# Patient Record
Sex: FEMALE | Race: WHITE | NOT HISPANIC OR LATINO | Employment: FULL TIME | URBAN - METROPOLITAN AREA
[De-identification: names, ages, dates, MRNs, and addresses within clinical notes are randomized per-mention and may not be internally consistent; named-entity substitution may affect disease eponyms.]

---

## 2019-02-28 ENCOUNTER — HOSPITAL ENCOUNTER (OUTPATIENT)
Facility: MEDICAL CENTER | Age: 34
End: 2019-02-28
Attending: INTERNAL MEDICINE
Payer: COMMERCIAL

## 2019-02-28 ENCOUNTER — OFFICE VISIT (OUTPATIENT)
Dept: MEDICAL GROUP | Facility: MEDICAL CENTER | Age: 34
End: 2019-02-28
Payer: COMMERCIAL

## 2019-02-28 ENCOUNTER — HOSPITAL ENCOUNTER (OUTPATIENT)
Dept: LAB | Facility: MEDICAL CENTER | Age: 34
End: 2019-02-28
Attending: INTERNAL MEDICINE
Payer: COMMERCIAL

## 2019-02-28 VITALS
BODY MASS INDEX: 35.52 KG/M2 | HEIGHT: 71 IN | OXYGEN SATURATION: 96 % | SYSTOLIC BLOOD PRESSURE: 132 MMHG | TEMPERATURE: 98.3 F | WEIGHT: 253.75 LBS | HEART RATE: 92 BPM | DIASTOLIC BLOOD PRESSURE: 78 MMHG

## 2019-02-28 DIAGNOSIS — Z13.6 SCREENING FOR CARDIOVASCULAR CONDITION: ICD-10-CM

## 2019-02-28 DIAGNOSIS — N92.6 IRREGULAR PERIODS: ICD-10-CM

## 2019-02-28 DIAGNOSIS — R53.83 FATIGUE, UNSPECIFIED TYPE: ICD-10-CM

## 2019-02-28 DIAGNOSIS — Z12.4 SCREENING FOR MALIGNANT NEOPLASM OF CERVIX: ICD-10-CM

## 2019-02-28 DIAGNOSIS — L70.8 OTHER ACNE: ICD-10-CM

## 2019-02-28 DIAGNOSIS — L68.0 HIRSUTISM: ICD-10-CM

## 2019-02-28 DIAGNOSIS — E28.2 PCOS (POLYCYSTIC OVARIAN SYNDROME): ICD-10-CM

## 2019-02-28 DIAGNOSIS — Z00.00 HEALTH CARE MAINTENANCE: ICD-10-CM

## 2019-02-28 DIAGNOSIS — Z72.0 TOBACCO ABUSE: ICD-10-CM

## 2019-02-28 DIAGNOSIS — Z01.419 WELL FEMALE EXAM WITH ROUTINE GYNECOLOGICAL EXAM: ICD-10-CM

## 2019-02-28 DIAGNOSIS — Z76.89 ENCOUNTER TO ESTABLISH CARE: ICD-10-CM

## 2019-02-28 DIAGNOSIS — E66.9 OBESITY (BMI 30-39.9): ICD-10-CM

## 2019-02-28 LAB
25(OH)D3 SERPL-MCNC: 19 NG/ML (ref 30–100)
ALBUMIN SERPL BCP-MCNC: 4.4 G/DL (ref 3.2–4.9)
ALBUMIN/GLOB SERPL: 1.6 G/DL
ALP SERPL-CCNC: 52 U/L (ref 30–99)
ALT SERPL-CCNC: 17 U/L (ref 2–50)
ANION GAP SERPL CALC-SCNC: 5 MMOL/L (ref 0–11.9)
AST SERPL-CCNC: 16 U/L (ref 12–45)
BASOPHILS # BLD AUTO: 0.9 % (ref 0–1.8)
BASOPHILS # BLD: 0.07 K/UL (ref 0–0.12)
BILIRUB SERPL-MCNC: 0.7 MG/DL (ref 0.1–1.5)
BUN SERPL-MCNC: 11 MG/DL (ref 8–22)
CALCIUM SERPL-MCNC: 9.7 MG/DL (ref 8.5–10.5)
CHLORIDE SERPL-SCNC: 105 MMOL/L (ref 96–112)
CHOLEST SERPL-MCNC: 231 MG/DL (ref 100–199)
CO2 SERPL-SCNC: 24 MMOL/L (ref 20–33)
CREAT SERPL-MCNC: 0.74 MG/DL (ref 0.5–1.4)
EOSINOPHIL # BLD AUTO: 0.2 K/UL (ref 0–0.51)
EOSINOPHIL NFR BLD: 2.7 % (ref 0–6.9)
ERYTHROCYTE [DISTWIDTH] IN BLOOD BY AUTOMATED COUNT: 40.4 FL (ref 35.9–50)
FASTING STATUS PATIENT QL REPORTED: NORMAL
FSH SERPL-ACNC: 7.1 MIU/ML
GLOBULIN SER CALC-MCNC: 2.8 G/DL (ref 1.9–3.5)
GLUCOSE SERPL-MCNC: 90 MG/DL (ref 65–99)
HCT VFR BLD AUTO: 43.4 % (ref 37–47)
HDLC SERPL-MCNC: 47 MG/DL
HGB BLD-MCNC: 14.5 G/DL (ref 12–16)
IMM GRANULOCYTES # BLD AUTO: 0.01 K/UL (ref 0–0.11)
IMM GRANULOCYTES NFR BLD AUTO: 0.1 % (ref 0–0.9)
LDLC SERPL CALC-MCNC: 138 MG/DL
LH SERPL-ACNC: 9 IU/L
LYMPHOCYTES # BLD AUTO: 2.95 K/UL (ref 1–4.8)
LYMPHOCYTES NFR BLD: 39.6 % (ref 22–41)
MCH RBC QN AUTO: 30.7 PG (ref 27–33)
MCHC RBC AUTO-ENTMCNC: 33.4 G/DL (ref 33.6–35)
MCV RBC AUTO: 91.8 FL (ref 81.4–97.8)
MONOCYTES # BLD AUTO: 0.44 K/UL (ref 0–0.85)
MONOCYTES NFR BLD AUTO: 5.9 % (ref 0–13.4)
NEUTROPHILS # BLD AUTO: 3.78 K/UL (ref 2–7.15)
NEUTROPHILS NFR BLD: 50.8 % (ref 44–72)
NRBC # BLD AUTO: 0 K/UL
NRBC BLD-RTO: 0 /100 WBC
PLATELET # BLD AUTO: 265 K/UL (ref 164–446)
PMV BLD AUTO: 9.2 FL (ref 9–12.9)
POTASSIUM SERPL-SCNC: 3.9 MMOL/L (ref 3.6–5.5)
PROT SERPL-MCNC: 7.2 G/DL (ref 6–8.2)
RBC # BLD AUTO: 4.73 M/UL (ref 4.2–5.4)
SODIUM SERPL-SCNC: 134 MMOL/L (ref 135–145)
TRIGL SERPL-MCNC: 229 MG/DL (ref 0–149)
TSH SERPL DL<=0.005 MIU/L-ACNC: 3.13 UIU/ML (ref 0.38–5.33)
WBC # BLD AUTO: 7.5 K/UL (ref 4.8–10.8)

## 2019-02-28 PROCEDURE — 85025 COMPLETE CBC W/AUTO DIFF WBC: CPT

## 2019-02-28 PROCEDURE — 80061 LIPID PANEL: CPT

## 2019-02-28 PROCEDURE — 99000 SPECIMEN HANDLING OFFICE-LAB: CPT | Performed by: INTERNAL MEDICINE

## 2019-02-28 PROCEDURE — 36415 COLL VENOUS BLD VENIPUNCTURE: CPT

## 2019-02-28 PROCEDURE — 84403 ASSAY OF TOTAL TESTOSTERONE: CPT

## 2019-02-28 PROCEDURE — 84270 ASSAY OF SEX HORMONE GLOBUL: CPT

## 2019-02-28 PROCEDURE — 87624 HPV HI-RISK TYP POOLED RSLT: CPT

## 2019-02-28 PROCEDURE — 83001 ASSAY OF GONADOTROPIN (FSH): CPT

## 2019-02-28 PROCEDURE — 99214 OFFICE O/P EST MOD 30 MIN: CPT | Mod: 25 | Performed by: INTERNAL MEDICINE

## 2019-02-28 PROCEDURE — 82306 VITAMIN D 25 HYDROXY: CPT

## 2019-02-28 PROCEDURE — 84443 ASSAY THYROID STIM HORMONE: CPT

## 2019-02-28 PROCEDURE — 83002 ASSAY OF GONADOTROPIN (LH): CPT

## 2019-02-28 PROCEDURE — 80053 COMPREHEN METABOLIC PANEL: CPT

## 2019-02-28 PROCEDURE — 88175 CYTOPATH C/V AUTO FLUID REDO: CPT

## 2019-02-28 PROCEDURE — 99385 PREV VISIT NEW AGE 18-39: CPT | Performed by: INTERNAL MEDICINE

## 2019-02-28 ASSESSMENT — PATIENT HEALTH QUESTIONNAIRE - PHQ9: CLINICAL INTERPRETATION OF PHQ2 SCORE: 0

## 2019-02-28 NOTE — PROGRESS NOTES
CHIEF COMPLIANT:  Ling Angel is a 34 y.o. female who presents for annual exam    Pt has GYN provider: no    Recommendations:  Regular exercise at least 4 days a week  Diet: advised balanced diet  Dental exam at least 1-2 times per year  Sunscreen use: advised    Immunizations:  TdaP: Advised  Pneumonia: Advised  Influenza: Advised    GYN  Last PAP:   2011, normal   Abnormal PAP: no    Menarche:      Menses are irregular, with bleeding for ~ 6 days  No excess cramping or bleeding.   Takes OTC analgesics for cramping  Contraception: none    Irregular periods, acne, hirsutism, fatigue  PCOS  The patient has had the above signs and symptoms of PCOS.  She has never been tested.   Also complains of fatigue.   No temperature intolerance. No change in hair/skin quality, BMs.     OBESITY, Body mass index is 35.41 kg/m².  Onset: late 20'  Diet: Regular  Exercise: Insufficient  No temperature intolerance. No change in hair/skin quality, BMs.   No HTN, buffalo hump, purple striae, flushing.  FH of obesity: neg    CURRENT MEDICATIONS  No current outpatient prescriptions on file.     No current facility-administered medications for this visit.      ALLERGIES  Allergies: Amoxicillin  PAST MEDICAL HISTORY  She  has a past medical history of Allergy.  SURGICAL HISTORY  She  has no past surgical history on file.  SOCIAL HISTORY  Social History   Substance Use Topics   • Smoking status: Current Every Day Smoker     Packs/day: 0.25     Years: 3.00     Types: Cigarettes   • Smokeless tobacco: Never Used   • Alcohol use 2.4 oz/week     4 Glasses of wine per week     Social History     Social History Narrative   • No narrative on file     FAMILY HISTORY  Family History   Problem Relation Age of Onset   • Thyroid Mother         hypothyroid   • Hypertension Father    • Hyperlipidemia Father    • Other Sister         endometriosis   • Psychiatry Sister         depression   • Other Maternal Grandmother         alzheimers   •  "Genitourinary () Maternal Grandmother         PCOS   • Cancer Paternal Grandmother         lung   • Cancer Paternal Grandfather         lung   • Heart Attack Paternal Grandfather      Family Status   Relation Status   • Mo Alive   • Fa Alive   • Sis Alive   • MGMo    • PGMo    • PGFa    • MGFa Alive     REVIEW OF SYSTEMS  Constitutional: Denies fever, chills, sweats, fatigue.  Skin: negative for rash, scaling, itching, pigmentation, hair or nail changes.  Eyes: negative for blurred or double vision, eye pain, floaters and discharge from eyes.  ENT: negative for tinnitus, vertigo, dental problem and hoarseness, frequent URI's, sinus problems/pain.  Respiratory: negative for persistent cough, hemoptysis, dyspnea, wheezing, SOB.  Cardiovascular: negative for palpitations, tachycardia, irregular heart beats, chest pain, or peripheral edema.  Gastrointestinal: negative for anorexia, dysphagia, nausea, heartburn/reflux, abdominal pain, hemorrhoids, constipation or diarrhea.  Genitourinary: negative for dysuria, frequency, hesitancy, incontinence, abnormal vaginal discharge/bleeding.  Musculoskeletal: negative for back/joint pain, myalgia.   Neuro: negative for migraine headaches, involuntary movements or tremor  Psychiatric: negative for excessive alcohol use, illegal drug use, sleep problems, anxiety, depression.  Hematologic/Lymphatic/Immunologic: negative for anemia, unusual bruising, swollen glands.  Endocrine: negative for temperature intolerance, polydipsia, polyuria.     PHYSICAL EXAMINATION:  Blood pressure 132/78, pulse 92, temperature 36.8 °C (98.3 °F), temperature source Temporal, height 1.803 m (5' 10.98\"), weight 115.1 kg (253 lb 12 oz), last menstrual period 2019, SpO2 96 %, not currently breastfeeding.  Body mass index is 35.41 kg/m².  Wt Readings from Last 4 Encounters:   19 115.1 kg (253 lb 12 oz)   10/12/16 115.2 kg (254 lb)   10/21/11 87.1 kg (192 lb)   11 " 90.3 kg (199 lb)     General appearance:healthy, well developed, well nourished, well-groomed  Psych: alert, no distress, cooperative. Eye contact good, speech goal directed. Affect calm.   Eyes: conjunctivae and sclerae normal, lids and lashes normal, EOMI, PERRLA  ENT: Ears: external ears normal to inspection, canals clear. TMs pearly gray with normal light reflex and anatomic landmarks, Nose/Sinuses: nose shows no deformity, asymmetry, or inflammation, nasal mucosa normal, no sinus tenderness,   Oropharynx: lips normal without lesions, buccal mucosa normal, gums healthy, teeth intact, non-carious, palate normal, tongue midline and normal  Neck: no asymmetry, masses, adenopathy. Thyroid normal to palpation, carotids without bruits, no jugular venous distention  Lungs: clear to auscultation with good excursion, Normal respiratory rate. Chest symmetric no chest wall tenderness.  Cardiovascular: Regular rate and rhythm, no murmur.  No peripheral edema  Abdomen:  Soft, non-tender, no masses, HSM or palpable renal abnormalities. Bowel sounds normal, no bruits heard. No CVAT.  Musculoskeletal: no evidence of joint effusion, ROM of all joints is normal, no crepitation detected, strength grossly normal UE and LE, proximal and distal motor groups. No deformities present  Lymphatic: None significantly enlarged supraclavicular, axillary, or inguinal  Skin: color normal, vascularity normal, no rashes or suspicious lesions, no evidence of bleeding or bruising  Neuro: speech normal, mental status intact, gait grossly normal, muscle tone normal.  GYN: No suprapubic tenderness.  Perineum and external genitalia normal without rash.   Vagina with without discharge, normal mucosa.  Cervix w/o visible lesions or discharge. No CMT  Uterus normal size, non-tender, no masses,   Adnexa w/o mass or tenderness.   PAP smear was obtained.    LABS    Labs are reviewed and discussed with a patient  No results found for: CHOLSTRLTOT, LDL, HDL,  TRIGLYCERIDE    Lab Results   Component Value Date/Time    SODIUM 135 07/20/2011 07:12 AM    POTASSIUM 3.8 07/20/2011 07:12 AM    CHLORIDE 105 07/20/2011 07:12 AM    CO2 21 07/20/2011 07:12 AM    GLUCOSE 84 07/20/2011 07:12 AM    BUN 12 07/20/2011 07:12 AM    CREATININE 0.78 07/20/2011 07:12 AM     Lab Results   Component Value Date/Time    ALKPHOSPHAT 35 07/20/2011 07:12 AM    ASTSGOT 16 07/20/2011 07:12 AM    ALTSGPT 12 07/20/2011 07:12 AM    TBILIRUBIN 0.9 07/20/2011 07:12 AM      Lab Results   Component Value Date/Time    WBC 7.8 07/20/2011 07:12 AM    RBC 4.54 07/20/2011 07:12 AM    HEMOGLOBIN 14.5 07/20/2011 07:12 AM    HEMATOCRIT 41.5 07/20/2011 07:12 AM    MCV 91.5 07/20/2011 07:12 AM    MCH 31.9 07/20/2011 07:12 AM    MCHC 34.8 07/20/2011 07:12 AM    MPV 7.8 07/20/2011 07:12 AM    NEUTSPOLYS 68.0 07/20/2011 07:12 AM    LYMPHOCYTES 25.1 07/20/2011 07:12 AM    MONOCYTES 5.4 07/20/2011 07:12 AM    EOSINOPHILS 1.1 07/20/2011 07:12 AM    BASOPHILS 0.4 07/20/2011 07:12 AM      ASSESSMENT/PLAN    1. Well female exam with routine gynecological exam  - THINPREP PAP WITH HPV; Future  2. Screening for malignant neoplasm of cervix  - THINPREP PAP WITH HPV; Future    3. Health care maintenance  Per HPI  - Lipid Profile; Future    4. Screening for cardiovascular condition  - Lipid Profile; Future    5. Encounter to establish care  Reviewed PMH, PSH, FH, SH, ALL, MEDS, HCM/IMM.     6. Irregular periods  May be multifactorial, pending labs and imaging  Possible PCOS per sx  - TESTOSTERONE F&T FEMALES/CHILD; Future  - US-PELVIC TRANSVAGINAL ONLY; Future  - FSH/LH; Future  7. Other acne  - TESTOSTERONE F&T FEMALES/CHILD; Future  - US-PELVIC TRANSVAGINAL ONLY; Future  - FSH/LH; Future  8. Hirsutism  - TESTOSTERONE F&T FEMALES/CHILD; Future  - US-PELVIC TRANSVAGINAL ONLY; Future  - FSH/LH; Future    9. Fatigue, unspecified type  May be multifactorial  - CBC WITH DIFFERENTIAL; Future  - Comp Metabolic Panel; Future  - TSH;  Future  - VITAMIN D,25 HYDROXY; Future  - TESTOSTERONE F&T FEMALES/CHILD; Future    10. PCOS (polycystic ovarian syndrome)  Possible diagnosis  - US-PELVIC TRANSVAGINAL ONLY; Future  - FSH/LH; Future    11. Obesity (BMI 30-39.9)  - OBESITY COUNSELING (No Charge): Patient identified as having weight management issue.  Appropriate orders and counseling given.    12. Tobacco abuse  Advised to quit smoking    Smoking Counseling: Nonsmoker    Next office visit is due in 2 months    All questions are answered.     Please note that this dictation was created using voice recognition software. Despite all efforts, some minor grammar mistakes are possible.

## 2019-03-01 DIAGNOSIS — Z01.419 WELL FEMALE EXAM WITH ROUTINE GYNECOLOGICAL EXAM: ICD-10-CM

## 2019-03-01 DIAGNOSIS — Z12.4 SCREENING FOR MALIGNANT NEOPLASM OF CERVIX: ICD-10-CM

## 2019-03-01 LAB
CYTOLOGY REG CYTOL: ABNORMAL
HPV HR 12 DNA CVX QL NAA+PROBE: POSITIVE
HPV16 DNA SPEC QL NAA+PROBE: POSITIVE
HPV18 DNA SPEC QL NAA+PROBE: NEGATIVE
SPECIMEN SOURCE: ABNORMAL

## 2019-03-03 LAB
SHBG SERPL-SCNC: 30 NMOL/L (ref 30–135)
TESTOST FREE SERPL-MCNC: 9.3 PG/ML (ref 1.3–9.2)
TESTOST SERPL-MCNC: 53 NG/DL (ref 9–55)

## 2019-03-05 ENCOUNTER — TELEPHONE (OUTPATIENT)
Dept: MEDICAL GROUP | Facility: MEDICAL CENTER | Age: 34
End: 2019-03-05

## 2019-03-05 NOTE — TELEPHONE ENCOUNTER
----- Message from Cookie White M.D. sent at 3/4/2019  9:36 AM PST -----  Please, notify the patient that labs are reviewed, to schedule OV to discuss, thanks, Dr Bhatti

## 2019-03-05 NOTE — TELEPHONE ENCOUNTER
Phone Number Called: [phone] (home)     Message: Lm for patient to call back to schedule FV    Left Message for patient to call back: yes

## 2019-03-06 NOTE — TELEPHONE ENCOUNTER
Phone Number Called: [phone] (home)      Message: Left message for patient to schedule a follow up.     Left Message for patient to call back: yes

## 2019-03-08 ENCOUNTER — OFFICE VISIT (OUTPATIENT)
Dept: MEDICAL GROUP | Facility: MEDICAL CENTER | Age: 34
End: 2019-03-08
Payer: COMMERCIAL

## 2019-03-08 VITALS
OXYGEN SATURATION: 98 % | HEIGHT: 71 IN | HEART RATE: 89 BPM | DIASTOLIC BLOOD PRESSURE: 76 MMHG | WEIGHT: 254.85 LBS | SYSTOLIC BLOOD PRESSURE: 138 MMHG | TEMPERATURE: 98.8 F | BODY MASS INDEX: 35.68 KG/M2

## 2019-03-08 DIAGNOSIS — R87.613 HIGH GRADE SQUAMOUS INTRAEPITHELIAL LESION OF CERVIX: ICD-10-CM

## 2019-03-08 DIAGNOSIS — E55.9 HYPOVITAMINOSIS D: ICD-10-CM

## 2019-03-08 DIAGNOSIS — E87.1 HYPONATREMIA: ICD-10-CM

## 2019-03-08 DIAGNOSIS — R87.618 PAP SMEAR ABNORMALITY OF CERVIX/HUMAN PAPILLOMAVIRUS (HPV) POSITIVE: ICD-10-CM

## 2019-03-08 DIAGNOSIS — E66.9 OBESITY (BMI 30-39.9): ICD-10-CM

## 2019-03-08 DIAGNOSIS — E78.5 DYSLIPIDEMIA: ICD-10-CM

## 2019-03-08 PROCEDURE — 99214 OFFICE O/P EST MOD 30 MIN: CPT | Performed by: INTERNAL MEDICINE

## 2019-03-08 RX ORDER — ERGOCALCIFEROL 1.25 MG/1
50000 CAPSULE ORAL
Qty: 12 CAP | Refills: 1 | Status: SHIPPED | OUTPATIENT
Start: 2019-03-08 | End: 2019-09-25 | Stop reason: SDUPTHER

## 2019-03-08 RX ORDER — PHENTERMINE HYDROCHLORIDE 37.5 MG/1
37.5 TABLET ORAL
Qty: 30 TAB | Refills: 2 | Status: SHIPPED
Start: 2019-03-08 | End: 2019-06-06

## 2019-03-08 NOTE — PROGRESS NOTES
CHIEF COMPLAINT  Chief Complaint   Patient presents with   • Results     Lab Results   PAP    HPI  Patient is a 34 y.o. female patient who presents today for the following     Hyponatremia  The patient had borderline low sodium at 134  -She has craving for salt.  No diarrhea or increased sweating.    Dyslipidemia  The patient had abnormal lipid panel, no medications.  Diet: Regular  Exercise: Limited  BMI: 35  FH: Parents    Hypovitaminosis D  The patient had low vitamin D level at 19.   No vitamin D supplement.    HGSIL, pod HPV  The patient had positive D above GYN abnormalities on well female exam done on 2/28/19.  Denies abnormal vaginal discharge or bleeding.  She has not been sexually active for 2 years.  No family history of GYN cancer.    Reviewed PMH, PSH, FH, SH, ALL, HCM/IMM, no changes  Reviewed MEDS, no changes    Patient Active Problem List    Diagnosis Date Noted   • Health care maintenance 02/28/2019   • Obesity (BMI 30-39.9) 02/28/2019   • Irregular periods 02/28/2019   • PCOS (polycystic ovarian syndrome) 02/28/2019   • Tobacco abuse 10/12/2016     CURRENT MEDICATIONS  Current Outpatient Prescriptions   Medication Sig Dispense Refill   • vitamin D, Ergocalciferol, (DRISDOL) 30088 units Cap capsule Take 1 Cap by mouth every 7 days. 12 Cap 1   • phentermine (ADIPEX-P) 37.5 MG tablet Take 1 Tab by mouth every morning before breakfast for 90 days. 30 Tab 2     No current facility-administered medications for this visit.      ALLERGIES  Allergies: Amoxicillin  PAST MEDICAL HISTORY  Past Medical History:   Diagnosis Date   • Allergy     seasonal allergies   • Irregular periods    • Overweight      SURGICAL HISTORY  She  has no past surgical history on file.  SOCIAL HISTORY  Social History   Substance Use Topics   • Smoking status: Current Every Day Smoker     Packs/day: 0.25     Years: 3.00     Types: Cigarettes   • Smokeless tobacco: Never Used   • Alcohol use 2.4 oz/week     4 Glasses of wine per week  "    Social History     Social History Narrative   • No narrative on file     FAMILY HISTORY  Family History   Problem Relation Age of Onset   • Thyroid Mother         hypothyroid   • Hyperlipidemia Mother    • Hypertension Father    • Hyperlipidemia Father    • Other Sister         endometriosis   • Psychiatry Sister         depression   • Other Maternal Grandmother         alzheimers   • Genitourinary () Maternal Grandmother         PCOS   • Cancer Paternal Grandmother         lung   • Cancer Paternal Grandfather         lung   • Heart Attack Paternal Grandfather      Family Status   Relation Status   • Mo Alive   • Fa Alive   • Sis Alive   • MGMo    • PGMo    • PGFa    • MGFa Alive     ROS   Constitutional: Negative for fatigue.  HENT: Negative for congestion  Eyes: Negative for blurred vision.   Respiratory: Negative for shortness of breath.  Cardiovascular: Negative for chest pain, palpitations.   Gastrointestinal: Negative for heartburn, abdominal pain.   Genitourinary: As above  Musculoskeletal: Negative for significant myalgias, back pain and joint pain.   Skin: Negative for rash and itching.   Neuro: Negative for dizziness,  headaches.   Endo/Heme/Allergies: Does not bruise/bleed easily.   Psychiatric/Behavioral: Negative for depression.    PHYSICAL EXAM   Blood pressure 138/76, pulse 89, temperature 37.1 °C (98.8 °F), temperature source Temporal, height 1.803 m (5' 10.98\"), weight 115.6 kg (254 lb 13.6 oz), SpO2 98 %, not currently breastfeeding. Body mass index is 35.56 kg/m².  General:  NAD, well appearing  HEENT:   NC/AT, PERRLA, EOMI, TMs are clear. Oropharyngeal mucosa is pink,  without lesions;  no cervical / supraclavicular  lymphadenopathy, no thyromegaly.    Cardiovascular: RRR.   No m/r/g. No carotid bruits .      Lungs:   CTAB, no w/r/r, no respiratory distress.  Abdomen: Soft, NT/ND.  Extremities:  2+ DP and radial pulses bilaterally.  No c/c/e.   Skin:  Warm, dry.  No " erythema. No rash.   Neurologic: Alert & oriented x 3. CN II-XII grossly intact.No focal deficits.  Psychiatric:  Affect normal, mood normal, judgment normal.    LABS     Labs are reviewed and discussed with a patient  Lab Results   Component Value Date/Time    CHOLSTRLTOT 231 (H) 02/28/2019 08:29 AM     (H) 02/28/2019 08:29 AM    HDL 47 02/28/2019 08:29 AM    TRIGLYCERIDE 229 (H) 02/28/2019 08:29 AM       Lab Results   Component Value Date/Time    SODIUM 134 (L) 02/28/2019 08:29 AM    POTASSIUM 3.9 02/28/2019 08:29 AM    CHLORIDE 105 02/28/2019 08:29 AM    CO2 24 02/28/2019 08:29 AM    GLUCOSE 90 02/28/2019 08:29 AM    BUN 11 02/28/2019 08:29 AM    CREATININE 0.74 02/28/2019 08:29 AM     Lab Results   Component Value Date/Time    ALKPHOSPHAT 52 02/28/2019 08:29 AM    ASTSGOT 16 02/28/2019 08:29 AM    ALTSGPT 17 02/28/2019 08:29 AM    TBILIRUBIN 0.7 02/28/2019 08:29 AM       Ref. Range 2/28/2019 2/28/2019    25-Hydroxy   Vitamin D 25 Latest Ref Range: 30 - 100 ng/mL  19 (L)   TSH Latest Ref Range: 0.380 - 5.330 uIU/mL  3.130   HPV Genotype 16 Latest Ref Range: Negative  POSITIVE (A)    HPV Genotype 18 Latest Ref Range: Negative  Negative    HPV Other High Risk  Latest Ref Range: Negative  POSITIVE (A)    Source Unknown Cervical    FSH Latest Units: mIU/mL  7.1   Luteinizing Hormone Latest Units: IU/L  9.0   Sex  Bind Globulin Latest Ref Range: 30 - 135 nmol/L  30   Testosterone Fr LCMS Latest Ref Range: 1.3 - 9.2 pg/mL  9.3 (H)   Testosterone,Total Latest Ref Range: 9 - 55 ng/dL  53     Lab Results   Component Value Date/Time    WBC 7.5 02/28/2019 08:29 AM    RBC 4.73 02/28/2019 08:29 AM    HEMOGLOBIN 14.5 02/28/2019 08:29 AM    HEMATOCRIT 43.4 02/28/2019 08:29 AM    MCV 91.8 02/28/2019 08:29 AM    MCH 30.7 02/28/2019 08:29 AM    MCHC 33.4 (L) 02/28/2019 08:29 AM    MPV 9.2 02/28/2019 08:29 AM    NEUTSPOLYS 50.80 02/28/2019 08:29 AM    LYMPHOCYTES 39.60 02/28/2019 08:29 AM    MONOCYTES 5.90 02/28/2019 08:29  AM    EOSINOPHILS 2.70 02/28/2019 08:29 AM    BASOPHILS 0.90 02/28/2019 08:29 AM        IMAGING     None    ASSESMENT AND PLAN        1. Hyponatremia  Advised to continue good salt intake    2. Dyslipidemia  Treatment options discussed.  Advised low carb diet, exercise, watch for WT.     3. Hypovitaminosis D  Start high-dose vitamin D  - VITAMIN D,25 HYDROXY; Future  - vitamin D, Ergocalciferol, (DRISDOL) 88699 units Cap capsule; Take 1 Cap by mouth every 7 days.  Dispense: 12 Cap; Refill: 1    4. Pap smear abnormality of cervix/human papillomavirus (HPV) positive  - REFERRAL TO GYNECOLOGY  5. High grade squamous intraepithelial lesion of cervix  - REFERRAL TO GYNECOLOGY    6. Obesity (BMI 30-39.9)  Advised  -1500-calorie diet  -Daily exercise with heart rate 120-130 for at least 30 minutes    - phentermine (ADIPEX-P) 37.5 MG tablet; Take 1 Tab by mouth every morning before breakfast for 90 days.  Dispense: 30 Tab; Refill: 2  - OBESITY COUNSELING (No Charge): Patient identified as having weight management issue.  Appropriate orders and counseling given.    Counseling:   - Smoking: Advised to quit smoking    Followup: Return in about 3 months (around 6/8/2019), or if symptoms worsen or fail to improve.    All questions are answered.    Please note that this dictation was created using voice recognition software, and that there might be errors of franco and possibly content.

## 2019-03-14 ENCOUNTER — HOSPITAL ENCOUNTER (OUTPATIENT)
Dept: RADIOLOGY | Facility: MEDICAL CENTER | Age: 34
End: 2019-03-14
Attending: INTERNAL MEDICINE
Payer: COMMERCIAL

## 2019-03-14 DIAGNOSIS — L70.8 OTHER ACNE: ICD-10-CM

## 2019-03-14 DIAGNOSIS — L68.0 HIRSUTISM: ICD-10-CM

## 2019-03-14 DIAGNOSIS — N92.6 IRREGULAR PERIODS: ICD-10-CM

## 2019-03-14 DIAGNOSIS — E28.2 PCOS (POLYCYSTIC OVARIAN SYNDROME): ICD-10-CM

## 2019-03-14 PROCEDURE — 76830 TRANSVAGINAL US NON-OB: CPT

## 2019-03-25 ENCOUNTER — GYNECOLOGY VISIT (OUTPATIENT)
Dept: OBGYN | Facility: CLINIC | Age: 34
End: 2019-03-25
Payer: COMMERCIAL

## 2019-03-25 VITALS — DIASTOLIC BLOOD PRESSURE: 70 MMHG | WEIGHT: 248 LBS | SYSTOLIC BLOOD PRESSURE: 118 MMHG | BODY MASS INDEX: 34.61 KG/M2

## 2019-03-25 DIAGNOSIS — N92.6 IRREGULAR MENSTRUAL BLEEDING: ICD-10-CM

## 2019-03-25 DIAGNOSIS — R87.613 HGSIL (HIGH GRADE SQUAMOUS INTRAEPITHELIAL LESION) ON PAP SMEAR OF CERVIX: ICD-10-CM

## 2019-03-25 DIAGNOSIS — E28.2 PCOS (POLYCYSTIC OVARIAN SYNDROME): ICD-10-CM

## 2019-03-25 PROCEDURE — 99204 OFFICE O/P NEW MOD 45 MIN: CPT | Performed by: OBSTETRICS & GYNECOLOGY

## 2019-05-02 ENCOUNTER — OFFICE VISIT (OUTPATIENT)
Dept: MEDICAL GROUP | Facility: MEDICAL CENTER | Age: 34
End: 2019-05-02
Payer: COMMERCIAL

## 2019-05-02 VITALS
TEMPERATURE: 97.2 F | HEART RATE: 96 BPM | OXYGEN SATURATION: 97 % | DIASTOLIC BLOOD PRESSURE: 82 MMHG | WEIGHT: 245.15 LBS | HEIGHT: 70 IN | BODY MASS INDEX: 35.1 KG/M2 | SYSTOLIC BLOOD PRESSURE: 132 MMHG

## 2019-05-02 DIAGNOSIS — R05.9 COUGH: ICD-10-CM

## 2019-05-02 DIAGNOSIS — Z20.828 EXPOSURE TO THE FLU: ICD-10-CM

## 2019-05-02 DIAGNOSIS — J40 BRONCHITIS: ICD-10-CM

## 2019-05-02 DIAGNOSIS — J06.9 UPPER RESPIRATORY INFECTION, ACUTE: ICD-10-CM

## 2019-05-02 LAB
FLUAV+FLUBV AG SPEC QL IA: NEGATIVE
INT CON NEG: NEGATIVE
INT CON POS: POSITIVE

## 2019-05-02 PROCEDURE — 87804 INFLUENZA ASSAY W/OPTIC: CPT | Performed by: INTERNAL MEDICINE

## 2019-05-02 PROCEDURE — 99214 OFFICE O/P EST MOD 30 MIN: CPT | Performed by: INTERNAL MEDICINE

## 2019-05-02 RX ORDER — CODEINE PHOSPHATE/GUAIFENESIN 10-100MG/5
5 LIQUID (ML) ORAL 3 TIMES DAILY PRN
Qty: 120 ML | Refills: 0 | Status: SHIPPED | OUTPATIENT
Start: 2019-05-02 | End: 2019-05-02 | Stop reason: SDUPTHER

## 2019-05-02 RX ORDER — AZITHROMYCIN 250 MG/1
TABLET, FILM COATED ORAL
Qty: 6 TAB | Refills: 0 | Status: SHIPPED | OUTPATIENT
Start: 2019-05-02 | End: 2020-05-29

## 2019-05-02 RX ORDER — AZITHROMYCIN 250 MG/1
TABLET, FILM COATED ORAL
Qty: 6 TAB | Refills: 0 | Status: SHIPPED | OUTPATIENT
Start: 2019-05-02 | End: 2019-05-02 | Stop reason: SDUPTHER

## 2019-05-02 RX ORDER — CODEINE PHOSPHATE/GUAIFENESIN 10-100MG/5
5 LIQUID (ML) ORAL 3 TIMES DAILY PRN
Qty: 120 ML | Refills: 0 | Status: SHIPPED | OUTPATIENT
Start: 2019-05-02 | End: 2019-05-12

## 2019-05-02 NOTE — PROGRESS NOTES
CHIEF COMPLAINT  Chief Complaint   Patient presents with   • Cough   • Fever   • Chills     HPI  Patient is a 34 y.o. female patient who presents today for the following     URI, cough  The patient got sick > 1 week ago with:  · Nasal congestion w yellow d/c now  · Fever, chills,  body aches, HA, body aches  · Sore throat, slight  · Cough with yello sputum  · N    Denies:   · Postnasal drip, pain in sinus areas  · Swollen glands  · Earache  · Difficulty swallowing  · Wheezing, chest pain  · Decreased appetite, vomiting, diarrhea, abdominal pain  · Skin rash.    · Meds used:   Cough syrup  · Sick contact:  The whole family  · Flu vaccine:    no    Reviewed PMH, PSH, FH, SH, ALL, HCM/IMM, no changes  Reviewed MEDS, no changes    Patient Active Problem List    Diagnosis Date Noted   • Health care maintenance 02/28/2019   • Obesity (BMI 30-39.9) 02/28/2019   • Irregular periods 02/28/2019   • PCOS (polycystic ovarian syndrome) 02/28/2019   • Tobacco abuse 10/12/2016     CURRENT MEDICATIONS  Current Outpatient Prescriptions   Medication Sig Dispense Refill   • metFORMIN (GLUCOPHAGE) 500 MG Tab Take 1 Tab by mouth 2 times a day, with meals. 60 Tab 11   • norethindrone-ethinyl estradiol (OVCON) 0.4-35 MG-MCG per tablet Take 1 Tab by mouth every day. 28 Tab 11   • vitamin D, Ergocalciferol, (DRISDOL) 45444 units Cap capsule Take 1 Cap by mouth every 7 days. 12 Cap 1   • phentermine (ADIPEX-P) 37.5 MG tablet Take 1 Tab by mouth every morning before breakfast for 90 days. 30 Tab 2     No current facility-administered medications for this visit.      ALLERGIES  Allergies: Amoxicillin  PAST MEDICAL HISTORY  Past Medical History:   Diagnosis Date   • Allergy     seasonal allergies   • Irregular periods    • Overweight      SURGICAL HISTORY  She  has no past surgical history on file.  SOCIAL HISTORY  Social History   Substance Use Topics   • Smoking status: Current Every Day Smoker     Packs/day: 0.25     Years: 3.00     Types:  "Cigarettes   • Smokeless tobacco: Never Used   • Alcohol use 2.4 oz/week     4 Glasses of wine per week     Social History     Social History Narrative   • No narrative on file     FAMILY HISTORY  Family History   Problem Relation Age of Onset   • Thyroid Mother         hypothyroid   • Hyperlipidemia Mother    • Hypertension Father    • Hyperlipidemia Father    • Other Sister         endometriosis   • Psychiatry Sister         depression   • Other Maternal Grandmother         alzheimers   • Genitourinary () Maternal Grandmother         PCOS   • Cancer Paternal Grandmother         lung   • Cancer Paternal Grandfather         lung   • Heart Attack Paternal Grandfather      Family Status   Relation Status   • Mo Alive   • Fa Alive   • Sis Alive   • MGMo    • PGMo    • PGFa    • MGFa Alive     ROS   Constitutional: as above.  HENT: as above.  Eyes: Negative for eye tearing.   Respiratory: as above.  Cardiovascular: Negative for chest pain, palpitations.   Gastrointestinal: As above.  Musculoskeletal: Complains of body aches  Skin: Negative for rash.   Neuro: Negative for dizziness; c/o headaches.   Endo/Heme/Allergies: Does not bruise/bleed easily.   Psychiatric/Behavioral: Negative for depression.    PHYSICAL EXAM   /82 (BP Location: Left arm, Patient Position: Sitting, BP Cuff Size: Adult)   Pulse 96   Temp 36.2 °C (97.2 °F) (Temporal)   Ht 1.778 m (5' 10\")   Wt 111.2 kg (245 lb 2.4 oz)   SpO2 97%  Body mass index is 35.18 kg/m².  General:  NAD, appears acutely sick  HEENT:   NC/AT, PERRLA, EOMI, TMs are clear. Pharyngeal mucosa is erythematous,  without lesions;  no cervical / supraclavicular  lymphadenopathy, no thyromegaly.    Cardiovascular: RRR.   No m/r/g. No carotid bruits .      Lungs:   Rhonchi bilaterally without wheezing or rails; no respiratory distress.  Abdomen: Soft, NT/ND + BS; no suprapubic tenderness; no masses or hepatosplenomegaly.  Extremities:  2+ DP and " radial pulses bilaterally.  No c/c/e.   Skin:  Warm, dry.  No erythema. No rash.   Neurologic: Alert & oriented x 3. No focal deficits.  Psychiatric:  Affect normal, mood normal, judgment normal.    LABS     Labs are reviewed and discussed with a patient    POCT flu: neg      IMAGING     None    ASSESMENT AND PLAN        1. Upper respiratory infection, acute  - POCT Influenza A/B  2. Exposure to the flu  - POCT Influenza A/B  3. Cough  - guaifenesin-codeine (TUSSI-ORGANIDIN NR) 100-10 MG/5ML syrup; Take 5 mL by mo  uth 3 times a day as needed for Cough for up to 10 days.  Dispense: 120 mL; Refill: 0  4. Bronchitis  - azithromycin (ZITHROMAX) 250 MG Tab; Take 1 tabl twice daily the first day, then 1 tabl daily, PO.  Dispense: 6 Tab; Refill: 0    Advised to take abx after a meal.   Side effects of abx use discussed with a patient. Advised to stop abx and call if develop any side effect, including diarrhea.     Recommendations:   - increase fluid intake;   - may take Tylenol/ibuprofen for fever, pain; nasal decongestant   - may apply warm packs over sinus areas     Counseling:   - Smoking:  Nonsmoker    Followup: as needed    All questions are answered.    Please note that this dictation was created using voice recognition software, and that there might be errors of franco and possibly content.

## 2019-05-07 ENCOUNTER — HOSPITAL ENCOUNTER (OUTPATIENT)
Dept: LAB | Facility: MEDICAL CENTER | Age: 34
End: 2019-05-07
Attending: OBSTETRICS & GYNECOLOGY
Payer: COMMERCIAL

## 2019-05-07 ENCOUNTER — GYNECOLOGY VISIT (OUTPATIENT)
Dept: OBGYN | Facility: CLINIC | Age: 34
End: 2019-05-07
Payer: COMMERCIAL

## 2019-05-07 VITALS
BODY MASS INDEX: 34.93 KG/M2 | DIASTOLIC BLOOD PRESSURE: 84 MMHG | SYSTOLIC BLOOD PRESSURE: 126 MMHG | HEIGHT: 70 IN | WEIGHT: 244 LBS

## 2019-05-07 DIAGNOSIS — Z32.00 ENCOUNTER FOR PREGNANCY TEST, RESULT UNKNOWN: ICD-10-CM

## 2019-05-07 DIAGNOSIS — R87.618 PAP SMEAR ABNORMALITY OF CERVIX/HUMAN PAPILLOMAVIRUS (HPV) POSITIVE: ICD-10-CM

## 2019-05-07 LAB
INT CON NEG: NEGATIVE
INT CON POS: POSITIVE
PATHOLOGY CONSULT NOTE: NORMAL
POC URINE PREGNANCY TEST: NEGATIVE

## 2019-05-07 PROCEDURE — 88305 TISSUE EXAM BY PATHOLOGIST: CPT | Mod: 59

## 2019-05-07 PROCEDURE — 81025 URINE PREGNANCY TEST: CPT | Performed by: OBSTETRICS & GYNECOLOGY

## 2019-05-07 PROCEDURE — 57454 BX/CURETT OF CERVIX W/SCOPE: CPT | Performed by: OBSTETRICS & GYNECOLOGY

## 2019-05-07 NOTE — LETTER
COLPOSCOPY / LEEP DATA SHEET    Ling Angel     1985      34 y.o.      No LMP recorded. Patient is not currently having periods (Reason: Irregular Menses).     @EDC@       Medical history:   o MVP    o Blood dyscrasia    o Rh     o Other: .    STD history:    o HPV     o HSV     o HIV     o GC     o Chlamydia     o None     o Other: .    HIV:   o Positive     o Negative                            IV Drug User:   o  Yes    o  No .    Age of first coitus:                                                Number of sex partners in lifetime:  .    Reason for exam:.    Prior abnormal PAPS?    o  Yes  o  No        Treatment: .    Prior history of condyloma?    o  Yes  o  No        Treatment:.     Colposcopic view - description:                                 Satisfactory?    o  Yes  o  No                    Squamo-columnar junction seen?  o  Yes  o  No.    Entire lesion seen?     o  Yes  o  No          PAP done?  o  Yes  o  No           Biopsies done?  o  Yes  o  No.    Biopsy sites:.    ECC done?    o  Yes  o  No      If no, reason:.    Impression:.    Recommendations:.             Colposcopist: ______________________________________________ Date: ___________________

## 2019-05-07 NOTE — PROGRESS NOTES
Ling Angel  34 y.o.  Female  here today for colposcopy to evaluate her for abnormal pap:     HSIL.    HPV +.      external genitalia normal, normal Bartholin's glands, urethra, Marley's glands, no vulvar lesions, normal discharge, well estrogenized, good vaginal support, vault clean with normal discharge, normal appearing perineal body and perianal region        External genitalia,urethral meatus, urethra, bladder and vagina normal. Cervix, uterus and adnexa intact and normal.  Anus and perineum normal. Bimanual and rectovaginal without masses or tenderness.}        SCJ visualized - lesion at 7 o'clock  : visible lesion(s) at 7 o'clock- acetowhite changes, no mosaicism or punctation        Ass:   HSIL  Colposcopy assessment: : Adequate: positive    MELISSA I : yes    CINII: no    CINIIIyes and no  doing well without problems  P.   Cervical Bx  Performed: 0700  ECC done  Pelvic Rest  Anticipate Vaginal discharge and spotting  RTC: as needed, see colpo sheet for drawing of cervix.

## 2019-05-09 DIAGNOSIS — D06.9 CIN III (CERVICAL INTRAEPITHELIAL NEOPLASIA GRADE III) WITH SEVERE DYSPLASIA: ICD-10-CM

## 2019-05-09 NOTE — PROGRESS NOTES
Sent in mychart:    Lennox Dubon,   Your pathology is unfortunately demonstrating MELISSA 3. We need to proceed with a LEEP procedure. This is an excisional procedure and can be done in the office. We numb the cervix and remove the tip of the cervix where the cellular changes are. I would recommend taking the rest of the day off of work and planning to take it easy for the whole week following this procedure. We do have the option of doing the LEEP in the operating room under anesthesia if the idea of doing it in the office worries you. If you have questions and want to discuss this before we do it, I would be happy to meet with you for an appointment to answer any questions you may have. Otherwise, I will submit a prior authorization request from your insurance so that we can proceed with the LEEP in the office. Once we have prior authorization, the office will call you to make the appointment.     Thank you,   Miranda Rich

## 2019-05-13 ENCOUNTER — TELEPHONE (OUTPATIENT)
Dept: OBGYN | Facility: CLINIC | Age: 34
End: 2019-05-13

## 2019-05-13 NOTE — TELEPHONE ENCOUNTER
05/13/19 2:28 PM     LM for pt to call back in regards to results of MELISSA 3 and need to do LEEP.

## 2019-05-13 NOTE — TELEPHONE ENCOUNTER
Pt returned our call. Pt has been notified by Dr. Rich through [employer] message. Pt had not other questions

## 2019-07-05 ENCOUNTER — GYNECOLOGY VISIT (OUTPATIENT)
Dept: OBGYN | Facility: CLINIC | Age: 34
End: 2019-07-05
Payer: COMMERCIAL

## 2019-07-05 ENCOUNTER — HOSPITAL ENCOUNTER (OUTPATIENT)
Dept: LAB | Facility: MEDICAL CENTER | Age: 34
End: 2019-07-05
Attending: OBSTETRICS & GYNECOLOGY
Payer: COMMERCIAL

## 2019-07-05 VITALS — DIASTOLIC BLOOD PRESSURE: 82 MMHG | SYSTOLIC BLOOD PRESSURE: 138 MMHG | BODY MASS INDEX: 35.15 KG/M2 | WEIGHT: 245 LBS

## 2019-07-05 DIAGNOSIS — R87.613 HIGH GRADE SQUAMOUS INTRAEPITHELIAL LESION OF CERVIX: ICD-10-CM

## 2019-07-05 DIAGNOSIS — Z98.890 S/P LEEP (STATUS POST LOOP ELECTROSURGICAL EXCISION PROCEDURE): ICD-10-CM

## 2019-07-05 DIAGNOSIS — D06.9 CIN III (CERVICAL INTRAEPITHELIAL NEOPLASIA GRADE III) WITH SEVERE DYSPLASIA: ICD-10-CM

## 2019-07-05 PROCEDURE — 88305 TISSUE EXAM BY PATHOLOGIST: CPT

## 2019-07-05 PROCEDURE — 88307 TISSUE EXAM BY PATHOLOGIST: CPT

## 2019-07-05 PROCEDURE — 57522 CONIZATION OF CERVIX: CPT | Performed by: OBSTETRICS & GYNECOLOGY

## 2019-07-05 PROCEDURE — 81025 URINE PREGNANCY TEST: CPT | Performed by: OBSTETRICS & GYNECOLOGY

## 2019-07-05 RX ORDER — IBUPROFEN 600 MG/1
600 TABLET ORAL EVERY 6 HOURS PRN
Qty: 30 TAB | Refills: 0 | Status: SHIPPED | OUTPATIENT
Start: 2019-07-05

## 2019-07-05 RX ORDER — HYDROCODONE BITARTRATE AND ACETAMINOPHEN 5; 325 MG/1; MG/1
1 TABLET ORAL EVERY 4 HOURS PRN
Qty: 10 TAB | Refills: 0 | Status: SHIPPED | OUTPATIENT
Start: 2019-07-05 | End: 2019-07-12

## 2019-07-05 NOTE — NON-PROVIDER
Pt here for a [facility]   Pharmacy verified  Good# [phone]  Pregnancy test: Negative

## 2019-07-05 NOTE — PROGRESS NOTES
Date of Procedure: 19    Patient presents to office for LEEP procedure. She is doing well today and has no complaints.  I explained procedure to patient and I reviewed risks, benefits and possible complications including bleeding complications, infection, injury to surrounding structures including bladder and ureters, possible association with  labor and cervical scar formation reviewed.  All questions and concerns were addressed.  Patient agrees for procedure and informed consents were obtained.  Urine pregnancy test is negative.    Vital signs stable, afebrile    PreOp Diagnosis:   MELISSA III    PostOp Diagnosis:   Same    Procedure(s):  Loop electrosurgical excision procedure of the cervix (LEEP)  ECC    Surgeon(s):  Marck Yuen MD    Anesthesiologist/Type of Anesthesia:  Local 1% lidocaine with epinephrine 12 mL    Specimen: Cervical ecto/endocervix biopsy    Estimated Blood Loss: 5 cc    Complications: none      DESCRIPTION OF PROCEDURE: Pathology from colposcopy reviewed in detail with the patient. Consent forms and procedure reviewed in detail with the patient.  The patient was placed in the dorsal supine lithotomy position.      A coated metal bivalve speculum was placed in the vagina.  Anterior lip of the cervix was grasped with a single-tooth tenaculum.  The vagina was prepped with Betadine.  Cervix was prepped with Lugol's solution to demarcate cervical lesion. 1% lidocaine with epinephrine 12 mL was injected into the cervix circumferentially. A loop wire  (Gynex Radius Loop Electrode 15 mm x8mm) was placed on the bovie and used to take a biopsy of the endocervix.  Hemostasis was obtained at the LEEP bed with the ball tipped Bovie. Monsel solution was applied against the LEEP bed. Good hemostasis was noted.    All instruments were removed from the vagina. All needle and instrument counts were correct. The patient tolerated the procedure very well. She was discharged home from the office in  stable condition.    PLAN: Biopsy to be sent to pathology. Further plan of care pending results.  Strict pelvic rest for 2 weeks. Pads only, no intercourse.  Will call patient next week with results. Bleeding precautions educated.  Postop pain management discussed and ibuprofen was prescribed along with a few tablets of Norco.patient was counseled on these medications.  Call MD with questions/concerns.

## 2019-07-23 ENCOUNTER — GYNECOLOGY VISIT (OUTPATIENT)
Dept: OBGYN | Facility: CLINIC | Age: 34
End: 2019-07-23
Payer: COMMERCIAL

## 2019-07-23 VITALS — SYSTOLIC BLOOD PRESSURE: 126 MMHG | WEIGHT: 247 LBS | BODY MASS INDEX: 35.44 KG/M2 | DIASTOLIC BLOOD PRESSURE: 80 MMHG

## 2019-07-23 DIAGNOSIS — R87.613 HIGH GRADE SQUAMOUS INTRAEPITHELIAL LESION OF CERVIX: ICD-10-CM

## 2019-07-23 PROCEDURE — 99024 POSTOP FOLLOW-UP VISIT: CPT | Performed by: OBSTETRICS & GYNECOLOGY

## 2019-07-23 NOTE — NON-PROVIDER
Pt here to F/u on test results  LEEP on 07/05/19   Pt denies vaginal bleeding or pelvic pain  Pharmacy verified  Good# [phone]

## 2019-07-23 NOTE — PROGRESS NOTES
SUBJECTIVE:  Ling Angel presents to the clinic 2 weeks following Leep Cone     Eating a regular diet without difficulty. Bowel movement are Normal.  The patient is not having any pain. Spotting. Patient Denies Incisional pain, drainage or redness    OBJECTIVE:  /80   Wt 112 kg (247 lb)   BMI 35.44 kg/m²   Current Outpatient Prescriptions on File Prior to Visit   Medication Sig Dispense Refill   • ibuprofen (MOTRIN) 600 MG Tab Take 1 Tab by mouth every 6 hours as needed for Mild Pain or Moderate Pain. 30 Tab 0   • azithromycin (ZITHROMAX) 250 MG Tab Take 1 tabl twice daily the first day, then 1 tabl daily, PO. 6 Tab 0   • metFORMIN (GLUCOPHAGE) 500 MG Tab Take 1 Tab by mouth 2 times a day, with meals. 60 Tab 11   • norethindrone-ethinyl estradiol (OVCON) 0.4-35 MG-MCG per tablet Take 1 Tab by mouth every day. 28 Tab 11   • vitamin D, Ergocalciferol, (DRISDOL) 76172 units Cap capsule Take 1 Cap by mouth every 7 days. 12 Cap 1     No current facility-administered medications on file prior to visit.        Constitutional:  alert, no distress.  Abdomen:  soft, bowel sounds active, non-tender.  Incision:  healing well, no drainage, no erythema, no hernia, no seroma, no swelling, no dehiscence, incision well approximated.    IMPRESSION: Doing well postoperatively.  Pt is to increase activities as tolerated.    Lab:   Recent Results (from the past 1008 hour(s))   Histology Request    Collection Time: 07/05/19  3:04 PM   Result Value Ref Range    Pathology Request Sent to Histo    POCT Pregnancy    Collection Time: 07/05/19  3:32 PM   Result Value Ref Range    POC Urine Pregnancy Test Negative Negative    Internal Control Positive Positive     Internal Control Negative Negative      Lab:   [facility]              [address] [place] [address], [address] [facility], [place] [address]              Phone [phone]          Fax [phone]  Kirk Allen M.D.     Sulma Hicks M.D.     Luis Antonio  "ALISSON Quiroga M.D.    JONAH Howard D.O. David E. Palosaari, M.D.    Patient:    FLORINDA LANZA       Specimen    [id number]                                           #:      Copies to:            MATTEO VALLADARES MD                      SURGICAL PATHOLOGY CONSULTATION    FINAL DIAGNOSIS:    A. Cervical cone biopsy:         Squamoglandular mucosa demonstrating area of biopsy site changes          and HPV changes but no dysplasia or invasive carcinoma          identified.  B. Endocervical curettings:         Fragments of benign endocervical tissue in a background of          mucin. No dysplasia or malignancy identified.    Comment: This case has been reviewed by a second pathologist, Dr. Rainey, who concurs with the diagnosis. Previous biopsy showed high  grade squamous dysplasia (CIN3) which correlated with recent PAP smear  results.  Findings are most consistent with the area of high grade  dysplasia being biopsied out.                                        Diagnosis performed by:                                      BASHIR SAL MD  ------------------------------------------------------------------------  --  CODES: 2003x1  2005x1    PREOPERATIVE DIAGNOSIS:  High grade squamous intraepithelial lesion of cervix (R87.613); MELISSA III  (cervical intraepithelial neoplasia grade III) with severe dysplasia  (D06.9).    SPECIMEN(S)  A. Cervical cone biopsy:  B. Endocervical curettings:    GROSS DESCRIPTION:  A.  Received in formalin labeled with the two patient identifiers and  designated \"Florinda Lanza, cervical cone biopsy\" are two disrupted  fragments of pink-gray mucosa that are unoriented. They measure 1.5 x 1  x 0.6; the other measures 1.7 x 0.7 x 0..4. The first fragment is  inked, sectioned and submitted in the first two cassettes. The second  fragment is so irregular it falls apart upon sectioning.  It is  submitted in cassettes " "3 and 4. TE 4/[id number]    B.  Received in formalin labeled with the two patient identifiers and  designated \"Ling Jeanne, ECC\" is an endocervical brush with  adherent tan tinted mucoidal material. This is scraped off the brush.  The formalin is filtered resulting in a 0.5 cm aggregate of mucoidal  material. TE 1/[id number] NGUYEN/km    MICROSCOPIC DESCRIPTION:  Microscopic examination was performed.  Please see diagnosis.      Report electronically signed by:  BASHIR SAL MD  Diagnosis performed at: [facility]  Pathology  Department, [address] [place] [address]  [id number]  PLAN:  Continue any current medications.  (See Med List for details.)  Return to clinic  : in 6 month(s).    "

## 2019-09-25 DIAGNOSIS — E55.9 HYPOVITAMINOSIS D: ICD-10-CM

## 2019-09-25 RX ORDER — ERGOCALCIFEROL 1.25 MG/1
CAPSULE ORAL
Qty: 12 CAP | Refills: 1 | Status: SHIPPED | OUTPATIENT
Start: 2019-09-25 | End: 2020-05-29

## 2019-10-04 ENCOUNTER — GYNECOLOGY VISIT (OUTPATIENT)
Dept: OBGYN | Facility: CLINIC | Age: 34
End: 2019-10-04
Payer: COMMERCIAL

## 2019-10-04 VITALS — SYSTOLIC BLOOD PRESSURE: 138 MMHG | DIASTOLIC BLOOD PRESSURE: 64 MMHG | WEIGHT: 254 LBS | BODY MASS INDEX: 36.45 KG/M2

## 2019-10-04 DIAGNOSIS — N92.1 MENOMETRORRHAGIA: ICD-10-CM

## 2019-10-04 DIAGNOSIS — E28.2 PCOS (POLYCYSTIC OVARIAN SYNDROME): ICD-10-CM

## 2019-10-04 LAB
INT CON NEG: NEGATIVE
INT CON POS: POSITIVE
POC URINE PREGNANCY TEST: NEGATIVE

## 2019-10-04 PROCEDURE — 81025 URINE PREGNANCY TEST: CPT | Performed by: OBSTETRICS & GYNECOLOGY

## 2019-10-04 PROCEDURE — 99213 OFFICE O/P EST LOW 20 MIN: CPT | Performed by: OBSTETRICS & GYNECOLOGY

## 2019-10-04 NOTE — PROGRESS NOTES
Chief complaint;    Ling Angel is a 34 y.o.  who presents complaining of irregular vaginal bleeding..  Over the last 2 months the patient has had menstrual bleeding 2 to 3 weeks out of the month.  Patient has a history of PCOS per her other GYN provider she is never been pregnant she is not sexually active currently.  She is taking metformin.    Review of systems; denies fever chills abdominal pain, denies chest pain shortness of breath or urinary symptoms  Past medical history-  Past Medical History:   Diagnosis Date   • Allergy     seasonal allergies   • Irregular periods    • Overweight      Past surgical history-History reviewed. No pertinent surgical history.  Allergies-Amoxicillin  Medications-  Current Outpatient Medications on File Prior to Visit   Medication Sig Dispense Refill   • ergocalciferol (DRISDOL) 42536 UNIT capsule TAKE ONE CAPSULE BY MOUTH EVERY 7 DAYS 12 Cap 1   • ibuprofen (MOTRIN) 600 MG Tab Take 1 Tab by mouth every 6 hours as needed for Mild Pain or Moderate Pain. 30 Tab 0   • azithromycin (ZITHROMAX) 250 MG Tab Take 1 tabl twice daily the first day, then 1 tabl daily, PO. 6 Tab 0   • metFORMIN (GLUCOPHAGE) 500 MG Tab Take 1 Tab by mouth 2 times a day, with meals. 60 Tab 11   • norethindrone-ethinyl estradiol (OVCON) 0.4-35 MG-MCG per tablet Take 1 Tab by mouth every day. 28 Tab 11     No current facility-administered medications on file prior to visit.      Social history-  Social History     Socioeconomic History   • Marital status: Single     Spouse name: Not on file   • Number of children: Not on file   • Years of education: Not on file   • Highest education level: Not on file   Occupational History   • Not on file   Social Needs   • Financial resource strain: Not on file   • Food insecurity:     Worry: Not on file     Inability: Not on file   • Transportation needs:     Medical: Not on file     Non-medical: Not on file   Tobacco Use   • Smoking status: Current Every  Day Smoker     Packs/day: 0.25     Years: 3.00     Pack years: 0.75     Types: Cigarettes   • Smokeless tobacco: Never Used   Substance and Sexual Activity   • Alcohol use: Yes     Alcohol/week: 2.4 oz     Types: 4 Glasses of wine per week   • Drug use: No   • Sexual activity: Yes     Partners: Male     Birth control/protection: Condom     Comment: single; no kids; wk:    Lifestyle   • Physical activity:     Days per week: Not on file     Minutes per session: Not on file   • Stress: Not on file   Relationships   • Social connections:     Talks on phone: Not on file     Gets together: Not on file     Attends [employer] service: Not on file     Active member of club or organization: Not on file     Attends meetings of clubs or organizations: Not on file     Relationship status: Not on file   • Intimate partner violence:     Fear of current or ex partner: Not on file     Emotionally abused: Not on file     Physically abused: Not on file     Forced sexual activity: Not on file   Other Topics Concern   • Not on file   Social History Narrative   • Not on file     Past Family History-no history of breast or ovarian cancer    Physical examination;  Alert and oriented x3  General a thin well-developed well-nourished female in no apparent distress  Vitals:    10/04/19 1406   BP: 138/64   Weight: 115.2 kg (254 lb)     Skin is warm and dry  Neck-supple  HEENT-head-atraumatic, normocephalic, EOMI, PERRLA  Cardiovascular-regular rate and rhythm, normal S1-S2, no murmurs or gallops  Lungs-clear to auscultation bilaterally  Back-negative CVA tenderness  Abdomen-nondistended positive bowel sounds soft nontender no masses or hepatosplenomegaly  Pelvic examination;  External genitalia-no visible lesions   Vagina-minimal blood or discharge  Cervix-no gross lesions  Uterus-normal size and shape, nontender  Adnexa without mass or tenderness  Extremities without cyanosis clubbing or edema  Neurologic exam grossly  intact    Urine hCG-negative    Impression;  Menometrorrhagia  PCOS      Plan;  Change OCP to  [place]      25  Minutes spent with the patient in face-to-face contact, greater than 50% of the time spent on counseling and coordination of care. All questions answered in detail.

## 2020-02-08 ENCOUNTER — OFFICE VISIT (OUTPATIENT)
Dept: URGENT CARE | Facility: CLINIC | Age: 35
End: 2020-02-08
Payer: COMMERCIAL

## 2020-02-08 VITALS
SYSTOLIC BLOOD PRESSURE: 104 MMHG | DIASTOLIC BLOOD PRESSURE: 70 MMHG | WEIGHT: 240 LBS | RESPIRATION RATE: 20 BRPM | HEIGHT: 71 IN | BODY MASS INDEX: 33.6 KG/M2 | HEART RATE: 112 BPM | OXYGEN SATURATION: 99 % | TEMPERATURE: 97.8 F

## 2020-02-08 DIAGNOSIS — R68.89 FLU-LIKE SYMPTOMS: ICD-10-CM

## 2020-02-08 DIAGNOSIS — J03.00 STREP TONSILLITIS: ICD-10-CM

## 2020-02-08 LAB
FLUAV+FLUBV AG SPEC QL IA: NEGATIVE
INT CON NEG: NEGATIVE
INT CON NEG: NORMAL
INT CON POS: NORMAL
INT CON POS: POSITIVE
S PYO AG THROAT QL: POSITIVE

## 2020-02-08 PROCEDURE — 87880 STREP A ASSAY W/OPTIC: CPT | Performed by: EMERGENCY MEDICINE

## 2020-02-08 PROCEDURE — 87804 INFLUENZA ASSAY W/OPTIC: CPT | Performed by: EMERGENCY MEDICINE

## 2020-02-08 PROCEDURE — 99203 OFFICE O/P NEW LOW 30 MIN: CPT | Performed by: EMERGENCY MEDICINE

## 2020-02-08 RX ORDER — DEXAMETHASONE 4 MG/1
10 TABLET ORAL ONCE
Qty: 3 TAB | Refills: 0 | Status: SHIPPED | OUTPATIENT
Start: 2020-02-08 | End: 2020-02-08

## 2020-02-08 RX ORDER — AZITHROMYCIN 250 MG/1
TABLET, FILM COATED ORAL
Qty: 6 TAB | Refills: 0 | Status: SHIPPED | OUTPATIENT
Start: 2020-02-08 | End: 2020-05-29

## 2020-02-08 ASSESSMENT — ENCOUNTER SYMPTOMS
DIARRHEA: 0
NAUSEA: 1
MYALGIAS: 1
HOARSE VOICE: 0
COUGH: 1
SWOLLEN GLANDS: 1
ABDOMINAL PAIN: 0
VOMITING: 0
HEADACHES: 1

## 2020-02-08 ASSESSMENT — PAIN SCALES - GENERAL: PAINLEVEL: 8=MODERATE-SEVERE PAIN

## 2020-02-08 NOTE — PROGRESS NOTES
Subjective:      Ling Angel is a 35 y.o. female who presents with Pharyngitis (sore throat, fever, swollen glands, no appitite, for 3 days)            Pharyngitis    This is a new problem. Episode onset: 3 days. The problem has been unchanged. Neither side of throat is experiencing more pain than the other. Maximum temperature: recent. The pain is moderate. Associated symptoms include congestion, coughing, headaches, a plugged ear sensation and swollen glands. Pertinent negatives include no abdominal pain, diarrhea, hoarse voice or vomiting. She has had no exposure to strep. She has tried NSAIDs for the symptoms. The treatment provided mild relief.       Review of Systems   Constitutional: Positive for malaise/fatigue.   HENT: Positive for congestion. Negative for hoarse voice.    Respiratory: Positive for cough.    Gastrointestinal: Positive for nausea. Negative for abdominal pain, diarrhea and vomiting.   Musculoskeletal: Positive for myalgias.   Skin: Negative for rash.   Neurological: Positive for headaches.   Endo/Heme/Allergies: Positive for environmental allergies.     Past Medical History:   Diagnosis Date   • Allergy     seasonal allergies   • Irregular periods    • Overweight       Amoxicillin     Current Outpatient Medications:   •  Norethin-Eth Estrad Triphasic, 1 Tab, Oral, DAILY, Taking  •  ibuprofen, 600 mg, Oral, Q6HRS PRN, Taking  •  metFORMIN, 500 mg, Oral, BID WITH MEALS, Taking  •  norethindrone-ethinyl estradiol, 1 Tab, Oral, DAILY, Taking  •  ergocalciferol, TAKE ONE CAPSULE BY MOUTH EVERY 7 DAYS, Not Taking  •  azithromycin, Take 1 tabl twice daily the first day, then 1 tabl daily, PO., Not Taking   family history includes Cancer in her paternal grandfather and paternal grandmother; Genitourinary () Problems in her maternal grandmother; Heart Attack in her paternal grandfather; Hyperlipidemia in her father and mother; Hypertension in her father; Other in her maternal grandmother  "and sister; Psychiatric Illness in her sister; Thyroid in her mother.   Social History     Tobacco Use   • Smoking status: Current Every Day Smoker     Packs/day: 0.25     Years: 3.00     Pack years: 0.75     Types: Cigarettes   • Smokeless tobacco: Never Used   Substance Use Topics   • Alcohol use: Yes     Alcohol/week: 2.4 oz     Types: 4 Glasses of wine per week   • Drug use: No         Objective:     /70   Pulse (!) 112   Temp 36.6 °C (97.8 °F) (Temporal)   Resp 20   Ht 1.803 m (5' 11\")   Wt 108.9 kg (240 lb)   SpO2 99%   BMI 33.47 kg/m²      Physical Exam  Constitutional:       General: She is not in acute distress.     Appearance: She is well-developed. She is not toxic-appearing.   HENT:      Head:      Jaw: No trismus.      Right Ear: Tympanic membrane and ear canal normal.      Left Ear: Tympanic membrane and ear canal normal.      Nose: Mucosal edema and rhinorrhea present.      Mouth/Throat:      Pharynx: Uvula midline. Posterior oropharyngeal erythema present. No oropharyngeal exudate or uvula swelling.      Tonsils: Tonsillar exudate present. No tonsillar abscesses. Swelling: 3+ on the right. 3+ on the left.   Eyes:      Conjunctiva/sclera: Conjunctivae normal.   Neck:      Musculoskeletal: Neck supple.      Trachea: Trachea normal.   Cardiovascular:      Rate and Rhythm: Normal rate and regular rhythm.      Heart sounds: Normal heart sounds.   Pulmonary:      Effort: Pulmonary effort is normal.      Breath sounds: Normal breath sounds.   Lymphadenopathy:      Cervical: Cervical adenopathy present.      Right cervical: Superficial cervical adenopathy present.      Left cervical: Superficial cervical adenopathy present.   Skin:     General: Skin is warm and dry.   Neurological:      Mental Status: She is alert and oriented to person, place, and time.   Psychiatric:         Behavior: Behavior is cooperative.                 Assessment/Plan:       1. StrepTonsillitis  Positive- POCT Rapid " Strep A  Rx Zpak, Decadron  Recommended supportive care measures, including rest, increasing oral fluid intake and use of over-the-counter medications for relief of symptoms.  2. Flu-like symptoms  negative- POCT Influenza A/B

## 2020-02-08 NOTE — LETTER
February 8, 2020       Patient: Ling Angel   YOB: 1985   Date of Visit: 2/8/2020         To Whom It May Concern:    It is my medical opinion that Ling Angel should not attend work February 8-9, 2020.      Sincerely,          Oswaldo Gibson M.D.  Electronically Signed

## 2020-04-20 DIAGNOSIS — E28.2 PCOS (POLYCYSTIC OVARIAN SYNDROME): ICD-10-CM

## 2020-04-20 DIAGNOSIS — N92.6 IRREGULAR MENSTRUAL BLEEDING: ICD-10-CM

## 2020-04-20 RX ORDER — KETOCONAZOLE 20 MG/ML
5 SHAMPOO TOPICAL
Qty: 250 ML | Refills: 5 | Status: SHIPPED | OUTPATIENT
Start: 2020-04-20 | End: 2020-05-29

## 2020-04-20 RX ORDER — KETOCONAZOLE 20 MG/ML
SHAMPOO TOPICAL
COMMUNITY
End: 2020-04-20 | Stop reason: SDUPTHER

## 2020-05-14 DIAGNOSIS — N92.6 IRREGULAR MENSTRUAL BLEEDING: ICD-10-CM

## 2020-05-14 DIAGNOSIS — E28.2 PCOS (POLYCYSTIC OVARIAN SYNDROME): ICD-10-CM

## 2020-05-26 ENCOUNTER — TELEPHONE (OUTPATIENT)
Dept: HEALTH INFORMATION MANAGEMENT | Facility: OTHER | Age: 35
End: 2020-05-26

## 2020-05-26 NOTE — TELEPHONE ENCOUNTER
1. Caller Name: Jose Armando                 Call Back Number: [phone]  [facility] PCP or Specialty Provider: Yes Dr. Bhatti        2.  Does patient have any new or worsening symptoms of respiratory illness? No.    3.  Does patient have any comoribidities? None     4.  Has the patient traveled in the last 14 days OR had any known contact with someone who is suspected or confirmed to have COVID-19?  No.    5. Disposition: Cleared by RN Triage as potential is low for COVID-19; OK to keep/schedule appointment    Note routed to [facility] Provider: LORETO only.      Patient was exposed to Covid 19 in January and became ill with respiratory symptoms and Strep in early February.  She would like to have the antibody test for Covid 19.

## 2020-05-28 PROBLEM — E66.9 OBESITY (BMI 30-39.9): Status: RESOLVED | Noted: 2019-02-28 | Resolved: 2020-05-28

## 2020-05-28 PROBLEM — N92.6 IRREGULAR MENSTRUAL CYCLE: Status: RESOLVED | Noted: 2019-02-28 | Resolved: 2020-05-28

## 2020-05-28 NOTE — PROGRESS NOTES
CHIEF COMPLAINT   Annual    HPI  Ling Angel is a 35 y.o. female who presents today for the following     Recommendations:  Regular exercise at least 4 days a week  Diet: advised balanced diet  Dental exam at least 1-2 times per year  Sunscreen use: advised     Immunizations:  TdaP: Advised  Pneumonia: Advised  Influenza: Advised     GYN  Last PAP:   2019, normal   Abnormal PAP: no     Menarche:      Menses are irregular, with bleeding for ~ 6 days  No excess cramping or bleeding.   Takes OTC analgesics for cramping  Contraception: OCP     PCOS, MELISSA, St post LEEP  The patient has had the above signs and symptoms of PCOS.  She has never been tested.              Also complains of fatigue.              No temperature intolerance. No change in hair/skin quality, BMs.    St post LEEP, 7/2029    BMI 36  Onset: late 20'  Diet: Regular  Exercise: Insufficient  No temperature intolerance. No change in hair/skin quality, BMs.   No HTN, buffalo hump, purple striae, flushing.  FH of obesity: neg     Dyslipidemia  The patient had abnormal lipid panel, no meds  Diet: Regular  Exercise: Insufficient  BMI: 36  Family history: Parents.     Hypovitaminosis D  The patient had low vitamin D level.  Vitamin D supplement: OTC.    Reviewed PMH, PSH, FH, SH, ALL, HCM/IMM, no changes  Reviewed MEDS, no changes    Patient Active Problem List    Diagnosis Date Noted   • MELISSA III (cervical intraepithelial neoplasia grade III) with severe dysplasia 07/05/2019   • S/P LEEP (status post loop electrosurgical excision procedure) 07/05/2019   • Health care maintenance 02/28/2019   • Obesity (BMI 30-39.9) 02/28/2019   • Irregular periods 02/28/2019   • PCOS (polycystic ovarian syndrome) 02/28/2019   • Tobacco abuse 10/12/2016     CURRENT MEDICATIONS  Current Outpatient Medications   Medication Sig Dispense Refill   • metFORMIN (GLUCOPHAGE) 500 MG Tab TAKE 1 TABLET BY MOUTH TWICE A DAY WITH MEALS 60 Tab 0   • norethindrone-ethinyl  estradiol (OVCON) 0.4-35 MG-MCG per tablet Take 1 Tab by mouth every day. 28 Tab 11   • ketoconazole (NIZORAL) 2 % shampoo Apply 5 mL to affected area(s) 1 time daily as needed. 250 mL 5   • azithromycin (ZITHROMAX) 250 MG Tab Take 2 pills on day 1, then 1 pill daily on day 2-5 6 Tab 0   • Norethin-Eth Estrad Triphasic 0.5/1/0.5-35 MG-MCG Tab Take 1 Tab by mouth every day. 28 Tab 11   • ergocalciferol (DRISDOL) 00786 UNIT capsule TAKE ONE CAPSULE BY MOUTH EVERY 7 DAYS (Patient not taking: Reported on 2/8/2020) 12 Cap 1   • ibuprofen (MOTRIN) 600 MG Tab Take 1 Tab by mouth every 6 hours as needed for Mild Pain or Moderate Pain. 30 Tab 0   • azithromycin (ZITHROMAX) 250 MG Tab Take 1 tabl twice daily the first day, then 1 tabl daily, PO. (Patient not taking: Reported on 2/8/2020) 6 Tab 0     No current facility-administered medications for this visit.      ALLERGIES  Allergies: Amoxicillin  PAST MEDICAL HISTORY  Past Medical History:   Diagnosis Date   • Allergy     seasonal allergies   • Irregular periods    • Overweight      SURGICAL HISTORY  She  has no past surgical history on file.  SOCIAL HISTORY  Social History     Tobacco Use   • Smoking status: Current Every Day Smoker     Packs/day: 0.25     Years: 3.00     Pack years: 0.75     Types: Cigarettes   • Smokeless tobacco: Never Used   Substance Use Topics   • Alcohol use: Yes     Alcohol/week: 2.4 oz     Types: 4 Glasses of wine per week   • Drug use: No     Social History     Social History Narrative   • Not on file     FAMILY HISTORY  Family History   Problem Relation Age of Onset   • Thyroid Mother         hypothyroid   • Hyperlipidemia Mother    • Hypertension Father    • Hyperlipidemia Father    • Other Sister         endometriosis   • Psychiatric Illness Sister         depression   • Other Maternal Grandmother         alzheimers   • Genitourinary () Problems Maternal Grandmother         PCOS   • Cancer Paternal Grandmother         lung   • Cancer  "Paternal Grandfather         lung   • Heart Attack Paternal Grandfather      Family Status   Relation Name Status   • Mo  Alive   • Fa  Alive   • Sis  Alive   • MGMo     • PGMo     • PGFa     • MGFa  Alive     ROS   Constitutional: Negative for fever, chills, fatigue.  HENT: Negative for congestion, sore throat.  Eyes: Negative for vision problems.   Respiratory: Negative for cough, shortness of breath.  Cardiovascular: Negative for chest pain, palpitations.   Gastrointestinal: Negative for heartburn, nausea, abdominal pain.   Genitourinary: Negative for dysuria.  Musculoskeletal: Negative for significant myalgia, back and joint pain.   Skin: Negative for rash.   Neuro: Negative for dizziness, weakness and headaches.   Endo/Heme/Allergies: Does not bruise/bleed easily.   Psychiatric/Behavioral: Negative for depression.    Objective   Blood Pressure 104/68 (BP Location: Right arm, Patient Position: Sitting, BP Cuff Size: Adult long)   Pulse 74   Temperature 36.5 °C (97.7 °F) (Temporal)   Height 1.803 m (5' 11\")   Weight 119.6 kg (263 lb 9.6 oz)   Oxygen Saturation 98%   Body Mass Index 36.76 kg/m²   Physical Exam:  Constitutional: Alert, no distress, well-groomed.  Skin: No rash in visible areas.  Eye: Round. Conjunctiva clear, lids normal.  ENMT: Lips pink without lesions, good dentition. Phonation normal.  Neck: No visible masses or thyromegaly. Moves freely without pain.  CV: no peripheral cyanosis, tachycardia.  Respiratory: Unlabored respiratory effort, no cough or audible wheezing.  Psych: Alert and oriented x3, normal affect and mood.     Labs     Labs are reviewed and discussed with a patient  Lab Results   Component Value Date/Time    CHOLSTRLTOT 231 (H) 2019 08:29 AM     (H) 2019 08:29 AM    HDL 47 2019 08:29 AM    TRIGLYCERIDE 229 (H) 2019 08:29 AM       Lab Results   Component Value Date/Time    SODIUM 134 (L) 2019 08:29 AM    POTASSIUM " 3.9 02/28/2019 08:29 AM    CHLORIDE 105 02/28/2019 08:29 AM    CO2 24 02/28/2019 08:29 AM    GLUCOSE 90 02/28/2019 08:29 AM    BUN 11 02/28/2019 08:29 AM    CREATININE 0.74 02/28/2019 08:29 AM     Lab Results   Component Value Date/Time    ALKPHOSPHAT 52 02/28/2019 08:29 AM    ASTSGOT 16 02/28/2019 08:29 AM    ALTSGPT 17 02/28/2019 08:29 AM    TBILIRUBIN 0.7 02/28/2019 08:29 AM      No results found for: HBA1C  No results found for: TSH  No results found for: FREET4    Lab Results   Component Value Date/Time    WBC 7.5 02/28/2019 08:29 AM    RBC 4.73 02/28/2019 08:29 AM    HEMOGLOBIN 14.5 02/28/2019 08:29 AM    HEMATOCRIT 43.4 02/28/2019 08:29 AM    MCV 91.8 02/28/2019 08:29 AM    MCH 30.7 02/28/2019 08:29 AM    MCHC 33.4 (L) 02/28/2019 08:29 AM    MPV 9.2 02/28/2019 08:29 AM    NEUTSPOLYS 50.80 02/28/2019 08:29 AM    LYMPHOCYTES 39.60 02/28/2019 08:29 AM    MONOCYTES 5.90 02/28/2019 08:29 AM    EOSINOPHILS 2.70 02/28/2019 08:29 AM    BASOPHILS 0.90 02/28/2019 08:29 AM      Imaging      None    Assessment and Plan     Ling Angel is a 35 y.o. female    1. Annual physical exam  Reviewed PMH, PSH, FH, SH, ALL, MEDS, HCM/IMM.   Advised healthy habits, diet, exercise.    2. Health care maintenance  Per HPI    3. PCOS (polycystic ovarian syndrome)  Continue metformin  - REFERRAL TO GYNECOLOGY  4. MELISSA III (cervical intraepithelial neoplasia grade III) with severe dysplasia  - REFERRAL TO GYNECOLOGY  5. S/P LEEP (status post loop electrosurgical excision procedure)  - REFERRAL TO GYNECOLOGY    6. Obesity (BMI 30-39.9)  Advised low-calorie diet, daily exercise  - phentermine (ADIPEX-P) 37.5 MG tablet; Take 1 Tab by mouth every morning before breakfast for 90 days.  Dispense: 30 Tab; Refill: 2    7. Dyslipidemia  Advised as above  - Comp Metabolic Panel; Future  - Lipid Profile; Future    8. Hypovitaminosis D  Continue current supplement, follow-up labs  - VITAMIN D,25 HYDROXY; Future    Follow-up: As needed

## 2020-05-29 ENCOUNTER — OFFICE VISIT (OUTPATIENT)
Dept: MEDICAL GROUP | Age: 35
End: 2020-05-29
Payer: COMMERCIAL

## 2020-05-29 VITALS
BODY MASS INDEX: 36.9 KG/M2 | HEIGHT: 71 IN | HEART RATE: 74 BPM | WEIGHT: 263.6 LBS | OXYGEN SATURATION: 98 % | DIASTOLIC BLOOD PRESSURE: 68 MMHG | SYSTOLIC BLOOD PRESSURE: 104 MMHG | TEMPERATURE: 97.7 F

## 2020-05-29 DIAGNOSIS — E55.9 HYPOVITAMINOSIS D: ICD-10-CM

## 2020-05-29 DIAGNOSIS — Z98.890 S/P LEEP (STATUS POST LOOP ELECTROSURGICAL EXCISION PROCEDURE): Primary | ICD-10-CM

## 2020-05-29 DIAGNOSIS — E66.9 OBESITY (BMI 30-39.9): ICD-10-CM

## 2020-05-29 DIAGNOSIS — Z00.00 HEALTH CARE MAINTENANCE: ICD-10-CM

## 2020-05-29 DIAGNOSIS — E78.5 DYSLIPIDEMIA: ICD-10-CM

## 2020-05-29 DIAGNOSIS — Z00.00 ANNUAL PHYSICAL EXAM: ICD-10-CM

## 2020-05-29 DIAGNOSIS — E28.2 PCOS (POLYCYSTIC OVARIAN SYNDROME): ICD-10-CM

## 2020-05-29 DIAGNOSIS — D06.9 CIN III (CERVICAL INTRAEPITHELIAL NEOPLASIA GRADE III) WITH SEVERE DYSPLASIA: ICD-10-CM

## 2020-05-29 PROCEDURE — 99395 PREV VISIT EST AGE 18-39: CPT | Performed by: INTERNAL MEDICINE

## 2020-05-29 RX ORDER — PHENTERMINE HYDROCHLORIDE 37.5 MG/1
37.5 TABLET ORAL
Qty: 30 TAB | Refills: 2 | Status: SHIPPED | OUTPATIENT
Start: 2020-05-29 | End: 2020-08-27

## 2020-05-29 RX ORDER — PHENTERMINE HYDROCHLORIDE 37.5 MG/1
37.5 TABLET ORAL
Qty: 30 TAB | Refills: 2 | Status: SHIPPED | OUTPATIENT
Start: 2020-05-29 | End: 2020-05-29

## 2020-05-29 ASSESSMENT — PATIENT HEALTH QUESTIONNAIRE - PHQ9: CLINICAL INTERPRETATION OF PHQ2 SCORE: 0

## 2020-05-29 ASSESSMENT — FIBROSIS 4 INDEX: FIB4 SCORE: 0.5

## 2020-07-01 DIAGNOSIS — N92.6 IRREGULAR MENSTRUAL BLEEDING: ICD-10-CM

## 2020-07-01 DIAGNOSIS — E28.2 PCOS (POLYCYSTIC OVARIAN SYNDROME): ICD-10-CM

## 2021-11-02 ENCOUNTER — HOSPITAL ENCOUNTER (OUTPATIENT)
Facility: MEDICAL CENTER | Age: 36
End: 2021-11-02
Attending: FAMILY MEDICINE
Payer: COMMERCIAL

## 2021-11-02 ENCOUNTER — NON-PROVIDER VISIT (OUTPATIENT)
Dept: MEDICAL GROUP | Age: 36
End: 2021-11-02
Payer: COMMERCIAL

## 2021-11-02 DIAGNOSIS — Z20.822 EXPOSURE TO COVID-19 VIRUS: ICD-10-CM

## 2021-11-02 PROCEDURE — U0003 INFECTIOUS AGENT DETECTION BY NUCLEIC ACID (DNA OR RNA); SEVERE ACUTE RESPIRATORY SYNDROME CORONAVIRUS 2 (SARS-COV-2) (CORONAVIRUS DISEASE [COVID-19]), AMPLIFIED PROBE TECHNIQUE, MAKING USE OF HIGH THROUGHPUT TECHNOLOGIES AS DESCRIBED BY CMS-2020-01-R: HCPCS

## 2021-11-02 PROCEDURE — 99999 PR NO CHARGE: CPT | Performed by: FAMILY MEDICINE

## 2021-11-02 PROCEDURE — U0005 INFEC AGEN DETEC AMPLI PROBE: HCPCS

## 2021-11-02 NOTE — PROGRESS NOTES
Ling Angel is a 36 y.o. female here for a non-provider visit for COVID testing.    Clinic collect COVID order in system?: Yes    Patient tolerated specimen collection and no adverse effects were observed or reported: Yes

## 2021-11-03 DIAGNOSIS — Z20.822 EXPOSURE TO COVID-19 VIRUS: ICD-10-CM

## 2021-11-03 LAB — COVID ORDER STATUS COVID19: NORMAL

## 2021-11-04 LAB
SARS-COV-2 RNA RESP QL NAA+PROBE: NOTDETECTED
SPECIMEN SOURCE: NORMAL

## 2023-08-15 ENCOUNTER — OFFICE VISIT (OUTPATIENT)
Dept: MEDICAL GROUP | Facility: MEDICAL CENTER | Age: 38
End: 2023-08-15
Payer: COMMERCIAL

## 2023-08-15 VITALS
HEART RATE: 91 BPM | BODY MASS INDEX: 40.28 KG/M2 | DIASTOLIC BLOOD PRESSURE: 72 MMHG | RESPIRATION RATE: 16 BRPM | OXYGEN SATURATION: 97 % | SYSTOLIC BLOOD PRESSURE: 126 MMHG | HEIGHT: 71 IN | WEIGHT: 287.7 LBS | TEMPERATURE: 97.9 F

## 2023-08-15 DIAGNOSIS — Z11.59 NEED FOR HEPATITIS C SCREENING TEST: ICD-10-CM

## 2023-08-15 DIAGNOSIS — E28.2 PCOS (POLYCYSTIC OVARIAN SYNDROME): ICD-10-CM

## 2023-08-15 DIAGNOSIS — Z00.00 PE (PHYSICAL EXAM), ANNUAL: ICD-10-CM

## 2023-08-15 DIAGNOSIS — Z56.6 STRESS AT WORK: ICD-10-CM

## 2023-08-15 DIAGNOSIS — Z23 NEED FOR VACCINATION: ICD-10-CM

## 2023-08-15 DIAGNOSIS — Z72.0 TOBACCO ABUSE: ICD-10-CM

## 2023-08-15 PROCEDURE — 3078F DIAST BP <80 MM HG: CPT | Performed by: NURSE PRACTITIONER

## 2023-08-15 PROCEDURE — 90472 IMMUNIZATION ADMIN EACH ADD: CPT | Performed by: NURSE PRACTITIONER

## 2023-08-15 PROCEDURE — 99214 OFFICE O/P EST MOD 30 MIN: CPT | Mod: 25 | Performed by: NURSE PRACTITIONER

## 2023-08-15 PROCEDURE — 90677 PCV20 VACCINE IM: CPT | Performed by: NURSE PRACTITIONER

## 2023-08-15 PROCEDURE — 90746 HEPB VACCINE 3 DOSE ADULT IM: CPT | Performed by: NURSE PRACTITIONER

## 2023-08-15 PROCEDURE — 3074F SYST BP LT 130 MM HG: CPT | Performed by: NURSE PRACTITIONER

## 2023-08-15 PROCEDURE — 90715 TDAP VACCINE 7 YRS/> IM: CPT | Performed by: NURSE PRACTITIONER

## 2023-08-15 PROCEDURE — 90471 IMMUNIZATION ADMIN: CPT | Performed by: NURSE PRACTITIONER

## 2023-08-15 RX ORDER — BUPROPION HYDROCHLORIDE 150 MG/1
150 TABLET ORAL EVERY MORNING
Qty: 30 TABLET | Refills: 1 | Status: SHIPPED | OUTPATIENT
Start: 2023-08-15 | End: 2023-08-18 | Stop reason: SDUPTHER

## 2023-08-15 SDOH — HEALTH STABILITY - MENTAL HEALTH: OTHER PHYSICAL AND MENTAL STRAIN RELATED TO WORK: Z56.6

## 2023-08-15 ASSESSMENT — ANXIETY QUESTIONNAIRES
2. NOT BEING ABLE TO STOP OR CONTROL WORRYING: MORE THAN HALF THE DAYS
7. FEELING AFRAID AS IF SOMETHING AWFUL MIGHT HAPPEN: NOT AT ALL
3. WORRYING TOO MUCH ABOUT DIFFERENT THINGS: SEVERAL DAYS
6. BECOMING EASILY ANNOYED OR IRRITABLE: NOT AT ALL
5. BEING SO RESTLESS THAT IT IS HARD TO SIT STILL: NOT AT ALL
4. TROUBLE RELAXING: NOT AT ALL
1. FEELING NERVOUS, ANXIOUS, OR ON EDGE: MORE THAN HALF THE DAYS
GAD7 TOTAL SCORE: 5

## 2023-08-15 ASSESSMENT — PATIENT HEALTH QUESTIONNAIRE - PHQ9: CLINICAL INTERPRETATION OF PHQ2 SCORE: 0

## 2023-08-15 NOTE — ASSESSMENT & PLAN NOTE
New to me. Stress at work, management position in customer service. DAREK is at 5.   Smoking helps symptoms.

## 2023-08-15 NOTE — ASSESSMENT & PLAN NOTE
New to me. dx'd in 2019 at [facility].   Issues now wt issues.  Periods normalized.   Previously managed with OCP and Metformnin

## 2023-08-15 NOTE — PROGRESS NOTES
Chief Complaint   Patient presents with    Establish Care    Other     PCOS symptoms       HISTORY OF PRESENT ILLNESS: Patient is a 38 y.o. female, established patient who presents today to discuss medical problems as listed below:    Health Maintenance:  COMPLETED       Allergies: Amoxicillin    Current Outpatient Medications   Medication Sig Dispense Refill    buPROPion (WELLBUTRIN XL) 150 MG XL tablet Take 1 Tablet by mouth every morning. 30 Tablet 1    nicotine polacrilex (NICORETTE) 2 MG Gum Take 1 Each by mouth 2 times a day as needed for Smoking Cessation. 40 Each 1    norethindrone-ethinyl estradiol (OVCON) 0.4-35 MG-MCG per tablet Take 1 Tab by mouth every day. 28 Tab 11    ibuprofen (MOTRIN) 600 MG Tab Take 1 Tab by mouth every 6 hours as needed for Mild Pain or Moderate Pain. 30 Tab 0     No current facility-administered medications for this visit.       Allergies, past medical history, past surgical history, family history, social history reviewed and updated.    Review of Systems:     - Constitutional: Negative for fever, chills, unexpected weight change, and fatigue/generalized weakness.     - HEENT: Negative for headaches, vision changes, hearing changes, ear pain, ear discharge, rhinorrhea, sinus congestion, sore throat, and neck pain.      - Respiratory: Negative for cough, sputum production, chest congestion, dyspnea, wheezing, and crackles.      - Cardiovascular: Negative for chest pain, palpitations, orthopnea, and bilateral lower extremity edema.     - Gastrointestinal: Negative for heartburn, nausea, vomiting, abdominal pain, hematochezia, melena, diarrhea, constipation, and greasy/foul-smelling stools.     - Genitourinary: Negative for dysuria, polyuria, hematuria, pyuria, urinary urgency, and urinary incontinence.    - Musculoskeletal: Negative for myalgias, back pain, and joint pain.     - Skin: Negative for rash, itching, cyanotic skin color change.     - Neurological: Negative for  "dizziness, tingling, tremors, focal sensory deficit, focal weakness and headaches.     - Endo/Heme/Allergies: Does not bruise/bleed easily.     - Psychiatric/Behavioral: Negative for depression, suicidal/homicidal ideation and memory loss.      All other systems reviewed and are negative    Exam:    /72   Pulse 91   Temp 36.6 °C (97.9 °F) (Temporal)   Resp 16   Ht 1.803 m (5' 11\")   Wt (!) 130 kg (287 lb 11.2 oz)   SpO2 97%   BMI 40.13 kg/m²  Body mass index is 40.13 kg/m².    Physical Exam:  Constitutional: Well-developed and well-nourished. Not diaphoretic. No distress.   Skin: Skin is warm and dry. No rash noted.  Head: Atraumatic without lesions.  Eyes: Conjunctivae and extraocular motions are normal. Pupils are equal, round, and reactive to light. No scleral icterus.   Ears:  External ears unremarkable. Tympanic membranes clear and intact.  Nose: Nares patent. Septum midline. Turbinates without erythema nor edema. No discharge.   Mouth/Throat: Dentition is normal. Tongue normal. Oropharynx is clear and moist. Posterior pharynx without erythema or exudates.  Neck: Supple, trachea midline. Normal range of motion. No thyromegaly present. No lymphadenopathy--cervical or supraclavicular.  Cardiovascular: Regular rate and rhythm, S1 and S2 without murmur, rubs, or gallops.    Chest: Effort normal. Clear to auscultation throughout. No adventitious sounds. No CVA tenderness.  Abdomen: Soft, non tender, and without distention. Active bowel sounds in all four quadrants. No rebound, guarding, masses or HSM.  : Negative for dysuria, polyuria, hematuria, pyuria, urinary urgency, and urinary incontinence.  Extremities: No cyanosis, clubbing, erythema, nor edema. Distal pulses intact and symmetric.   Musculoskeletal: All major joints AROM full in all directions without pain.  Neurological: Alert and oriented x 3. DTRs 2+/3 and symmetric. No cranial nerve deficit. 5/5 myotomes. Sensation intact. Negative " Ulices.  Psychiatric:  Behavior, mood, and affect are appropriate.  MA/nursing note and vitals reviewed.    Assessment/Plan:  PCOS (polycystic ovarian syndrome)  New to me. dx'd in 2019 at [facility].   Issues now wt issues.  Periods normalized.   Previously managed with OCP and Metformnin    Tobacco abuse  New to me. Smoking 10 yrs 1-2 pack per wk. Trying to quit.     Stress at work  New to me. Stress at work, management position in customer service. DAREK is at 5.   Smoking helps symptoms.    1. Need for vaccination  - Hepatitis B Vaccine Adult 20+  - Pneumococcal Conjugate Vaccine 20-Valent (19 yrs+)  - Tdap Vaccine =>8YO IM    2. PCOS (polycystic ovarian syndrome)    3. Stress at work    4. Tobacco abuse  Patient counseled.  Trial of Wellbutrin and nicotine gum.  - buPROPion (WELLBUTRIN XL) 150 MG XL tablet; Take 1 Tablet by mouth every morning.  Dispense: 30 Tablet; Refill: 1  - nicotine polacrilex (NICORETTE) 2 MG Gum; Take 1 Each by mouth 2 times a day as needed for Smoking Cessation.  Dispense: 40 Each; Refill: 1    5. PE (physical exam), annual  - CBC WITHOUT DIFFERENTIAL; Future  - Comp Metabolic Panel; Future  - FREE THYROXINE; Future  - HEMOGLOBIN A1C; Future  - Lipid Profile; Future  - T3 FREE; Future  - TSH; Future  - VITAMIN D,25 HYDROXY (DEFICIENCY); Future  - VITAMIN B12; Future  - IRON/TOTAL IRON BIND; Future  - FERRITIN; Future  - THYROID PEROXIDASE  (TPO) AB; Future    6. Need for hepatitis C screening test  - HEP C VIRUS ANTIBODY; Future      Discussed with patient possible alternative diagnoses, patient is to take all medications as prescribed.      If symptoms persist FU w/PCP, if symptoms worsen go to emergency room.      If experiencing any side effects from prescribed medications report to the office immediately or go to emergency room.     Reviewed indication, dosage, usage and potential adverse effects of prescribed medications.      Reviewed risks and benefits of treatment plan. Patient  verbalizes understanding of all instruction and verbally agrees to plan.     Discussed plan with the patient, and patient agrees to the above.      I personally reviewed prior external notes and test results pertinent to today's visit.      No follow-ups on file.2-3 wks

## 2023-08-18 ENCOUNTER — PATIENT MESSAGE (OUTPATIENT)
Dept: MEDICAL GROUP | Facility: MEDICAL CENTER | Age: 38
End: 2023-08-18
Payer: COMMERCIAL

## 2023-08-18 DIAGNOSIS — Z72.0 TOBACCO ABUSE: ICD-10-CM

## 2023-08-18 RX ORDER — BUPROPION HYDROCHLORIDE 150 MG/1
150 TABLET ORAL EVERY MORNING
Qty: 30 TABLET | Refills: 1 | Status: SHIPPED | OUTPATIENT
Start: 2023-08-18

## 2023-08-18 NOTE — PATIENT COMMUNICATION
Received request via: Patient    Was the patient seen in the last year in this department? Yes    Does the patient have an active prescription (recently filled or refills available) for medication(s) requested? Yes.     Does the patient have half-way Plus and need 100 day supply (blood pressure, diabetes and cholesterol meds only)? Patient does not have SCP

## 2023-09-01 ENCOUNTER — APPOINTMENT (OUTPATIENT)
Dept: MEDICAL GROUP | Facility: MEDICAL CENTER | Age: 38
End: 2023-09-01
Payer: COMMERCIAL

## 2023-09-06 ENCOUNTER — HOSPITAL ENCOUNTER (OUTPATIENT)
Dept: LAB | Facility: MEDICAL CENTER | Age: 38
End: 2023-09-06
Attending: NURSE PRACTITIONER
Payer: COMMERCIAL

## 2023-09-06 DIAGNOSIS — Z00.00 PE (PHYSICAL EXAM), ANNUAL: ICD-10-CM

## 2023-09-06 DIAGNOSIS — Z11.59 NEED FOR HEPATITIS C SCREENING TEST: ICD-10-CM

## 2023-09-06 LAB
25(OH)D3 SERPL-MCNC: 20 NG/ML (ref 30–100)
ALBUMIN SERPL BCP-MCNC: 4.3 G/DL (ref 3.2–4.9)
ALBUMIN/GLOB SERPL: 1.5 G/DL
ALP SERPL-CCNC: 58 U/L (ref 30–99)
ALT SERPL-CCNC: 12 U/L (ref 2–50)
ANION GAP SERPL CALC-SCNC: 8 MMOL/L (ref 7–16)
AST SERPL-CCNC: 18 U/L (ref 12–45)
BILIRUB SERPL-MCNC: 0.5 MG/DL (ref 0.1–1.5)
BUN SERPL-MCNC: 8 MG/DL (ref 8–22)
CALCIUM ALBUM COR SERPL-MCNC: 9 MG/DL (ref 8.5–10.5)
CALCIUM SERPL-MCNC: 9.2 MG/DL (ref 8.5–10.5)
CHLORIDE SERPL-SCNC: 107 MMOL/L (ref 96–112)
CHOLEST SERPL-MCNC: 197 MG/DL (ref 100–199)
CO2 SERPL-SCNC: 23 MMOL/L (ref 20–33)
CREAT SERPL-MCNC: 0.73 MG/DL (ref 0.5–1.4)
ERYTHROCYTE [DISTWIDTH] IN BLOOD BY AUTOMATED COUNT: 41.6 FL (ref 35.9–50)
EST. AVERAGE GLUCOSE BLD GHB EST-MCNC: 114 MG/DL
FERRITIN SERPL-MCNC: 66.7 NG/ML (ref 10–291)
GFR SERPLBLD CREATININE-BSD FMLA CKD-EPI: 108 ML/MIN/1.73 M 2
GLOBULIN SER CALC-MCNC: 2.9 G/DL (ref 1.9–3.5)
GLUCOSE SERPL-MCNC: 93 MG/DL (ref 65–99)
HBA1C MFR BLD: 5.6 % (ref 4–5.6)
HCT VFR BLD AUTO: 44.4 % (ref 37–47)
HCV AB SER QL: NORMAL
HDLC SERPL-MCNC: 36 MG/DL
HGB BLD-MCNC: 14.7 G/DL (ref 12–16)
IRON SATN MFR SERPL: 40 % (ref 15–55)
IRON SERPL-MCNC: 132 UG/DL (ref 40–170)
LDLC SERPL CALC-MCNC: 130 MG/DL
MCH RBC QN AUTO: 30.2 PG (ref 27–33)
MCHC RBC AUTO-ENTMCNC: 33.1 G/DL (ref 32.2–35.5)
MCV RBC AUTO: 91.2 FL (ref 81.4–97.8)
PLATELET # BLD AUTO: 294 K/UL (ref 164–446)
PMV BLD AUTO: 9.3 FL (ref 9–12.9)
POTASSIUM SERPL-SCNC: 4.4 MMOL/L (ref 3.6–5.5)
PROT SERPL-MCNC: 7.2 G/DL (ref 6–8.2)
RBC # BLD AUTO: 4.87 M/UL (ref 4.2–5.4)
SODIUM SERPL-SCNC: 138 MMOL/L (ref 135–145)
T3FREE SERPL-MCNC: 3.15 PG/ML (ref 2–4.4)
T4 FREE SERPL-MCNC: 1.19 NG/DL (ref 0.93–1.7)
THYROPEROXIDASE AB SERPL-ACNC: <9 IU/ML (ref 0–9)
TIBC SERPL-MCNC: 333 UG/DL (ref 250–450)
TRIGL SERPL-MCNC: 155 MG/DL (ref 0–149)
TSH SERPL DL<=0.005 MIU/L-ACNC: 2.49 UIU/ML (ref 0.38–5.33)
UIBC SERPL-MCNC: 201 UG/DL (ref 110–370)
VIT B12 SERPL-MCNC: 443 PG/ML (ref 211–911)
WBC # BLD AUTO: 8.4 K/UL (ref 4.8–10.8)

## 2023-09-06 PROCEDURE — 83036 HEMOGLOBIN GLYCOSYLATED A1C: CPT

## 2023-09-06 PROCEDURE — 83540 ASSAY OF IRON: CPT

## 2023-09-06 PROCEDURE — 82607 VITAMIN B-12: CPT

## 2023-09-06 PROCEDURE — 82728 ASSAY OF FERRITIN: CPT

## 2023-09-06 PROCEDURE — 84481 FREE ASSAY (FT-3): CPT

## 2023-09-06 PROCEDURE — 80061 LIPID PANEL: CPT

## 2023-09-06 PROCEDURE — 86803 HEPATITIS C AB TEST: CPT

## 2023-09-06 PROCEDURE — 82306 VITAMIN D 25 HYDROXY: CPT

## 2023-09-06 PROCEDURE — 80053 COMPREHEN METABOLIC PANEL: CPT

## 2023-09-06 PROCEDURE — 83550 IRON BINDING TEST: CPT

## 2023-09-06 PROCEDURE — 85027 COMPLETE CBC AUTOMATED: CPT

## 2023-09-06 PROCEDURE — 36415 COLL VENOUS BLD VENIPUNCTURE: CPT

## 2023-09-06 PROCEDURE — 84439 ASSAY OF FREE THYROXINE: CPT

## 2023-09-06 PROCEDURE — 84443 ASSAY THYROID STIM HORMONE: CPT

## 2023-09-06 PROCEDURE — 86376 MICROSOMAL ANTIBODY EACH: CPT

## 2023-09-07 ENCOUNTER — OFFICE VISIT (OUTPATIENT)
Dept: MEDICAL GROUP | Facility: MEDICAL CENTER | Age: 38
End: 2023-09-07
Payer: COMMERCIAL

## 2023-09-07 VITALS
BODY MASS INDEX: 40.15 KG/M2 | RESPIRATION RATE: 16 BRPM | SYSTOLIC BLOOD PRESSURE: 122 MMHG | WEIGHT: 286.8 LBS | DIASTOLIC BLOOD PRESSURE: 68 MMHG | OXYGEN SATURATION: 96 % | TEMPERATURE: 98.1 F | HEART RATE: 101 BPM | HEIGHT: 71 IN

## 2023-09-07 DIAGNOSIS — E55.9 HYPOVITAMINOSIS D: ICD-10-CM

## 2023-09-07 DIAGNOSIS — R63.8 WEIGHT DISORDER: ICD-10-CM

## 2023-09-07 DIAGNOSIS — E78.49 FAMILIAL HYPERLIPIDEMIA: ICD-10-CM

## 2023-09-07 PROCEDURE — 99214 OFFICE O/P EST MOD 30 MIN: CPT | Performed by: NURSE PRACTITIONER

## 2023-09-07 PROCEDURE — 3078F DIAST BP <80 MM HG: CPT | Performed by: NURSE PRACTITIONER

## 2023-09-07 PROCEDURE — 3074F SYST BP LT 130 MM HG: CPT | Performed by: NURSE PRACTITIONER

## 2023-09-07 RX ORDER — PHENTERMINE HYDROCHLORIDE 15 MG/1
15 CAPSULE ORAL EVERY MORNING
Qty: 30 CAPSULE | Refills: 0 | Status: SHIPPED | OUTPATIENT
Start: 2023-09-07 | End: 2023-10-07

## 2023-09-07 ASSESSMENT — FIBROSIS 4 INDEX: FIB4 SCORE: 0.65

## 2023-09-07 NOTE — ASSESSMENT & PLAN NOTE
Latest Reference Range & Units 09/06/23 08:06   25-Hydroxy   Vitamin D 25 30 - 100 ng/mL 20 (L)   (L): Data is abnormally low

## 2023-09-08 ENCOUNTER — APPOINTMENT (OUTPATIENT)
Dept: MEDICAL GROUP | Facility: MEDICAL CENTER | Age: 38
End: 2023-09-08
Payer: COMMERCIAL

## 2023-09-08 NOTE — ASSESSMENT & PLAN NOTE
New problem.  This is a chronic problem for patient.  Unable to lose weight despite the efforts.  Interested in prescription weight loss medication today.  Has not tried GLP-1 agonist or phentermine before.  Interested in phentermine today.  Current wt: 286 lbs  Goal wt: 180

## 2023-09-08 NOTE — PROGRESS NOTES
"Chief Complaint   Patient presents with    Lab Results    Weight Loss       HISTORY OF PRESENT ILLNESS: Patient is a 38 y.o. female, established patient who presents today to discuss medical problems as listed below:    Health Maintenance:  COMPLETED       Allergies: Amoxicillin    Current Outpatient Medications   Medication Sig Dispense Refill    buPROPion (WELLBUTRIN XL) 150 MG XL tablet Take 1 Tablet by mouth every morning. 30 Tablet 1    nicotine polacrilex (NICORETTE) 2 MG Gum Take 1 Each by mouth 2 times a day as needed for Smoking Cessation. 40 Each 1    norethindrone-ethinyl estradiol (OVCON) 0.4-35 MG-MCG per tablet Take 1 Tab by mouth every day. 28 Tab 11    ibuprofen (MOTRIN) 600 MG Tab Take 1 Tab by mouth every 6 hours as needed for Mild Pain or Moderate Pain. 30 Tab 0     No current facility-administered medications for this visit.       Allergies, past medical history, past surgical history, family history, social history reviewed and updated.    Review of Systems:     - Constitutional: Negative for fever, chills, unexpected weight change, and fatigue/generalized weakness.     - Respiratory: Negative for cough, sputum production, chest congestion, dyspnea, wheezing, and crackles.      - Cardiovascular: Negative for chest pain, palpitations, orthopnea, and bilateral lower extremity edema.     - Psychiatric/Behavioral: Negative for depression, suicidal/homicidal ideation and memory loss.      All other systems reviewed and are negative    Exam:    /68   Pulse (!) 101   Temp 36.7 °C (98.1 °F) (Temporal)   Resp 16   Ht 1.803 m (5' 11\")   Wt (!) 130 kg (286 lb 12.8 oz)   SpO2 96%   BMI 40.00 kg/m²  Body mass index is 40 kg/m².    Physical Exam:  Constitutional: Well-developed and well-nourished. Not diaphoretic. No distress.   Cardiovascular: Regular rate and rhythm, S1 and S2 without murmur, rubs, or gallops.    Chest: Effort normal. Clear to auscultation throughout. No adventitious sounds. "   Neurological: Alert and oriented x 3.   Psychiatric:  Behavior, mood, and affect are appropriate.  MA/nursing note and vitals reviewed.    LABS: 9/2023  results reviewed and discussed with the patient, questions answered.    Assessment/Plan:  Hypovitaminosis D   Latest Reference Range & Units 09/06/23 08:06   25-Hydroxy   Vitamin D 25 30 - 100 ng/mL 20 (L)   (L): Data is abnormally low    Familial hyperlipidemia   Latest Reference Range & Units 09/06/23 08:06   Cholesterol,Tot 100 - 199 mg/dL 197   Triglycerides 0 - 149 mg/dL 155 (H)   HDL >=40 mg/dL 36 !   LDL <100 mg/dL 130 (H)   (H): Data is abnormally high  !: Data is abnormal  New problem. FH in father. FH of MI in paternal GF.  In process of quitting smoking.       Weight disorder  New problem.  This is a chronic problem for patient.  Unable to lose weight despite the efforts.  Interested in prescription weight loss medication today.  Has not tried GLP-1 agonist or phentermine before.  Interested in phentermine today.  Current wt: 286 lbs  Goal wt: 180      1. Hypovitaminosis D  Uncontrolled, stable.  Discussed importance of optimization.  Patient would like to take OTC 2000 IUs daily.    2. Familial hyperlipidemia  Uncontrolled, stable. Discussed etiology and potential risk factors. Discussed 10-year ASCVD risk. Educated on healthy lifestyle including nutrition and exercise.  Avoid excessive red meat and simple carbohydrates.  Increase fiber intake to 25-30 g daily if tolerated and take fish oil 2 g nightly.  Advised low saturated fat, low carbohydrate diet.  Quit smoking if you do. Maintain adequate hydration. Advise daily exercise 45-60 mins per tolerance and preference.     3. Weight disorder  Uncontrolled, stable.  Discussed the options of management at this time.  We will start with phentermine, controlled substance agreement and [facility] drug screen obtained today.  We will follow-up in a month.  Discussed importance of healthy nutrition and exercise daily  to tolerance and preference.  We will continue next visit.  - Pain Management Screen; Future  - Controlled Substance Treatment Agreement      Discussed with patient possible alternative diagnoses, patient is to take all medications as prescribed.      If symptoms persist FU w/PCP, if symptoms worsen go to emergency room.      If experiencing any side effects from prescribed medications report to the office immediately or go to emergency room.     Reviewed indication, dosage, usage and potential adverse effects of prescribed medications.      Reviewed risks and benefits of treatment plan. Patient verbalizes understanding of all instruction and verbally agrees to plan.     Discussed plan with the patient, and patient agrees to the above.      I personally reviewed prior external notes and test results pertinent to today's visit.      No follow-ups on file. 1 mo

## 2023-09-08 NOTE — ASSESSMENT & PLAN NOTE
Latest Reference Range & Units 09/06/23 08:06   Cholesterol,Tot 100 - 199 mg/dL 197   Triglycerides 0 - 149 mg/dL 155 (H)   HDL >=40 mg/dL 36 !   LDL <100 mg/dL 130 (H)   (H): Data is abnormally high  !: Data is abnormal  New problem. FH in father. FH of MI in paternal GF.  In process of quitting smoking.

## 2023-10-06 ENCOUNTER — APPOINTMENT (OUTPATIENT)
Dept: MEDICAL GROUP | Facility: MEDICAL CENTER | Age: 38
End: 2023-10-06
Payer: COMMERCIAL

## 2024-04-17 ENCOUNTER — APPOINTMENT (OUTPATIENT)
Dept: MEDICAL GROUP | Facility: MEDICAL CENTER | Age: 39
End: 2024-04-17
Payer: COMMERCIAL

## 2024-04-18 ENCOUNTER — OFFICE VISIT (OUTPATIENT)
Dept: MEDICAL GROUP | Facility: MEDICAL CENTER | Age: 39
End: 2024-04-18
Payer: COMMERCIAL

## 2024-04-18 VITALS
HEIGHT: 71 IN | HEART RATE: 96 BPM | OXYGEN SATURATION: 98 % | RESPIRATION RATE: 17 BRPM | DIASTOLIC BLOOD PRESSURE: 62 MMHG | SYSTOLIC BLOOD PRESSURE: 128 MMHG | WEIGHT: 286.6 LBS | BODY MASS INDEX: 40.12 KG/M2 | TEMPERATURE: 97.5 F

## 2024-04-18 DIAGNOSIS — E66.9 OBESITY (BMI 30-39.9): ICD-10-CM

## 2024-04-18 PROCEDURE — 3074F SYST BP LT 130 MM HG: CPT | Performed by: FAMILY MEDICINE

## 2024-04-18 PROCEDURE — 99214 OFFICE O/P EST MOD 30 MIN: CPT | Performed by: FAMILY MEDICINE

## 2024-04-18 PROCEDURE — 3078F DIAST BP <80 MM HG: CPT | Performed by: FAMILY MEDICINE

## 2024-04-18 ASSESSMENT — ENCOUNTER SYMPTOMS
FEVER: 0
PALPITATIONS: 0
CHILLS: 0

## 2024-04-18 ASSESSMENT — PATIENT HEALTH QUESTIONNAIRE - PHQ9: CLINICAL INTERPRETATION OF PHQ2 SCORE: 0

## 2024-04-18 ASSESSMENT — FIBROSIS 4 INDEX: FIB4 SCORE: 0.67

## 2024-04-18 NOTE — PROGRESS NOTES
Verbal consent was acquired by the patient to use [employer] ambient listening note generation during this visit.      Milli was seen today for weight loss.    Diagnoses and all orders for this visit:    Obesity (BMI 30-39.9)  -     Tirzepatide-Weight Management 2.5 MG/0.5ML Solution Auto-injector; Inject 2.5 mg under the skin every 7 days.  -     Patient identified as having weight management issue.  Appropriate orders and counseling given.                  Assessment & Plan  1. Obesity.  The patient's chronic condition remains unstable. Her current Body Mass Index (BMI) is 39.97, and her weight is 286 pounds. Her objective weight is to achieve a minimum of 100 pounds. Previous attempts at diet modifications have not yielded significant improvements. However, her prolonged workload has limited her ability to engage in regular exercise. A comprehensive discussion was held regarding diet modification and an exercise regimen for weight loss. Various medications, including phentermine, Contrave, and GLP-1 medications such as Wegovy, were discussed. The patient expressed a preference to commence Zepbound therapy. A prescription for Zepbound, 2.5 mg every 7 days, was sent to the pharmacy. The potential side effects of the medication were discussed.    Follow-up  The patient is scheduled for a follow-up visit in 4 weeks to evaluate the effectiveness of the medication.          Chief complaint::The encounter diagnosis was Obesity (BMI 30-39.9).      History of Present Illness  The patient is a 31-year-old female who is here to talk about weight loss medications.    The patient, diagnosed with Polycystic Ovary Syndrome (PCOS), had a discussion with her primary care physician last year regarding weight loss medications. 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 Her work schedule extends to 60 to 70 hours  "per week, which has led to a limited exercise plan. Despite attempts at dieting and restrictive eating, her symptoms persist. Over the past two years, she has experienced a weight gain of approximately 30 to 40 pounds. Her lowest recorded weight in her adult life was 185 pounds, and she expresses a desire to lose 100 pounds. She expresses concern about rapid weight loss due to potential skin laxity. Over the past five years, she has always been slightly heavier, but her weight has deteriorated. She has been engaging in walking exercises for the past few weeks. She quit smoking a month ago. She lacks time for cooking due to her living situation. She has previously tried protein shakes. Her diet includes red meat once or twice a month. She is currently taking gummy apple cider vinegar, biotin, and a One A Day multivitamin. She is not currently taking any of her medications.         Review of Systems   Constitutional:  Negative for chills and fever.   Cardiovascular:  Negative for chest pain, palpitations and leg swelling.          Medications and Allergies:     Current Outpatient Medications   Medication Sig Dispense Refill    Tirzepatide-Weight Management 2.5 MG/0.5ML Solution Auto-injector Inject 2.5 mg under the skin every 7 days. 2 mL 0     No current facility-administered medications for this visit.       /62   Pulse 96   Temp 36.4 °C (97.5 °F)   Resp 17   Ht 1.803 m (5' 11\")   Wt (!) 130 kg (286 lb 9.6 oz)   SpO2 98% , Body mass index is 39.97 kg/m².      Physical Exam  Constitutional:       Appearance: Normal appearance. She is well-developed and well-groomed.   HENT:      Head: Normocephalic and atraumatic.      Right Ear: External ear normal.      Left Ear: External ear normal.   Eyes:      General:         Right eye: No discharge.         Left eye: No discharge.      Conjunctiva/sclera: Conjunctivae normal.   Cardiovascular:      Rate and Rhythm: Normal rate.   Pulmonary:      Effort: Pulmonary " effort is normal. No respiratory distress.   Musculoskeletal:      Cervical back: Neck supple.   Skin:     Findings: No rash.   Neurological:      Mental Status: She is alert.   Psychiatric:         Mood and Affect: Mood and affect normal.         Behavior: Behavior normal.              Results            Please note that this dictation was created using voice recognition software. I have made every reasonable attempt to correct obvious errors, but I expect that there are errors of grammar and possibly content that I did not discover before finalizing the note.

## 2024-05-15 DIAGNOSIS — E66.9 OBESITY (BMI 30-39.9): ICD-10-CM

## 2024-05-17 ENCOUNTER — TELEPHONE (OUTPATIENT)
Dept: MEDICAL GROUP | Facility: MEDICAL CENTER | Age: 39
End: 2024-05-17
Payer: COMMERCIAL

## 2024-05-17 NOTE — TELEPHONE ENCOUNTER
PA started for :     Ling Angel (Key: [id number])    Status  Sent to Plan today    Drug  Mounjaro 5MG/0.5ML pen-injectors

## 2024-05-30 ENCOUNTER — OFFICE VISIT (OUTPATIENT)
Dept: MEDICAL GROUP | Facility: MEDICAL CENTER | Age: 39
End: 2024-05-30
Payer: COMMERCIAL

## 2024-05-30 VITALS
HEART RATE: 90 BPM | HEIGHT: 71 IN | BODY MASS INDEX: 40.4 KG/M2 | SYSTOLIC BLOOD PRESSURE: 110 MMHG | RESPIRATION RATE: 16 BRPM | OXYGEN SATURATION: 97 % | TEMPERATURE: 97.8 F | DIASTOLIC BLOOD PRESSURE: 68 MMHG | WEIGHT: 288.6 LBS

## 2024-05-30 DIAGNOSIS — Z23 NEED FOR VACCINATION: ICD-10-CM

## 2024-05-30 DIAGNOSIS — E28.2 PCOS (POLYCYSTIC OVARIAN SYNDROME): ICD-10-CM

## 2024-05-30 DIAGNOSIS — E66.01 CLASS 3 SEVERE OBESITY WITHOUT SERIOUS COMORBIDITY WITH BODY MASS INDEX (BMI) OF 40.0 TO 44.9 IN ADULT, UNSPECIFIED OBESITY TYPE (HCC): ICD-10-CM

## 2024-05-30 PROBLEM — E66.813 CLASS 3 SEVERE OBESITY WITHOUT SERIOUS COMORBIDITY WITH BODY MASS INDEX (BMI) OF 40.0 TO 44.9 IN ADULT (HCC): Status: ACTIVE | Noted: 2024-05-30

## 2024-05-30 ASSESSMENT — ENCOUNTER SYMPTOMS
FEVER: 0
PALPITATIONS: 0
CHILLS: 0

## 2024-05-30 ASSESSMENT — FIBROSIS 4 INDEX: FIB4 SCORE: 0.67

## 2024-05-30 NOTE — ASSESSMENT & PLAN NOTE
Chronic condition unstable, counseled patient to eat healthy diet and exercise.  Increase Zepbound to 5 mg every 7 days she will let me know once she is about to finish this medication then we will send Zepbound 7.5 mg every 7 days

## 2024-05-30 NOTE — PROGRESS NOTES
FAMILY MEDICINE VISIT                                                               Assessment/Plan:         Problem List Items Addressed This Visit       PCOS (polycystic ovarian syndrome)     Chronic condition, unstable, eat healthy diet and exercise to lose weight. Continue Zepbound         Class 3 severe obesity without serious comorbidity with body mass index (BMI) of 40.0 to 44.9 in adult (HCC)     Chronic condition unstable, counseled patient to eat healthy diet and exercise.  Increase Zepbound to 5 mg every 7 days she will let me know once she is about to finish this medication then we will send Zepbound 7.5 mg every 7 days          Other Visit Diagnoses       Need for vaccination     Second dose of hep B vaccine given today    Relevant Orders    Hep B Adult 20+           Recommended to follow-up with OB/GYN for Pap smear.    Follow up in 2 months for medication follow-up      Chief complaint::Diagnoses of Class 3 severe obesity without serious comorbidity with body mass index (BMI) of 40.0 to 44.9 in adult, unspecified obesity type (HCC), PCOS (polycystic ovarian syndrome), and Need for vaccination were pertinent to this visit.    History of present illness: Ling Angel is a 39 y.o. female who presented for medication follow-up    Problem   Class 3 Severe Obesity Without Serious Comorbidity With Body Mass Index (Bmi) of 40.0 to 44.9 in Adult (Hcc)    Current weight 288 pounds.  Goal is to lose about 100 pounds.  By end of this year goal is to lose about 30 pounds  We started her on Zepbound 2.5 mg every 7 days.  She did not noticed decrease in appetite with this medication.  No major side effects with this medication.  She has started walking on the weekend.  She is eating healthier options now.  She has not received 5 mg medication from pharmacy     Pcos (Polycystic Ovarian Syndrome)    Has history of PCOS, having hard time losing weight, currently started on Zepbound.  Previously was on  "metformin            Review of systems:     Review of Systems   Constitutional:  Negative for chills and fever.   Cardiovascular:  Negative for chest pain, palpitations and leg swelling.        Medications and Allergies:     Current Outpatient Medications   Medication Sig Dispense Refill    Tirzepatide 5 MG/0.5ML Solution Pen-injector Inject 5 mg under the skin every 7 days. 2 mL 0     No current facility-administered medications for this visit.          Vitals:    /68 (BP Location: Left arm, Patient Position: Sitting, BP Cuff Size: Large adult)   Pulse 90   Temp 36.6 °C (97.8 °F) (Temporal)   Resp 16   Ht 1.803 m (5' 11\")   Wt (!) 131 kg (288 lb 9.6 oz)   SpO2 97%  Body mass index is 40.25 kg/m².    Physical Exam:     Physical Exam  Constitutional:       Appearance: Normal appearance. She is well-developed and well-groomed.   HENT:      Head: Normocephalic and atraumatic.      Right Ear: External ear normal.      Left Ear: External ear normal.   Eyes:      General:         Right eye: No discharge.         Left eye: No discharge.      Conjunctiva/sclera: Conjunctivae normal.   Cardiovascular:      Rate and Rhythm: Normal rate.   Pulmonary:      Effort: Pulmonary effort is normal. No respiratory distress.   Musculoskeletal:      Cervical back: Neck supple.   Skin:     Findings: No rash.   Neurological:      Mental Status: She is alert.   Psychiatric:         Mood and Affect: Mood and affect normal.         Behavior: Behavior normal.             Please note that this dictation was created using voice recognition software. I have made every reasonable attempt to correct obvious errors, but I expect that there are errors of grammar and possibly content that I did not discover before finalizing the note.      "

## 2024-05-31 ENCOUNTER — PATIENT MESSAGE (OUTPATIENT)
Dept: MEDICAL GROUP | Facility: MEDICAL CENTER | Age: 39
End: 2024-05-31
Payer: COMMERCIAL

## 2024-05-31 DIAGNOSIS — E66.9 OBESITY (BMI 30-39.9): ICD-10-CM

## 2024-06-05 ENCOUNTER — PATIENT MESSAGE (OUTPATIENT)
Dept: MEDICAL GROUP | Facility: MEDICAL CENTER | Age: 39
End: 2024-06-05
Payer: COMMERCIAL

## 2024-06-05 ENCOUNTER — TELEPHONE (OUTPATIENT)
Dept: MEDICAL GROUP | Facility: MEDICAL CENTER | Age: 39
End: 2024-06-05
Payer: COMMERCIAL

## 2024-06-05 NOTE — PROGRESS NOTES
Please call [facility] pharmacy to check what is going on with her Zepbound 5 mg prescription.

## 2024-06-05 NOTE — TELEPHONE ENCOUNTER
DOCUMENTATION OF PAR STATUS:    1. Name of Medication & Dose: Zepbound 5MG/0.5ML pen-injectors     2. Name of Prescription Coverage Company & phone #: [email or web address]    3. Date Prior Auth Submitted: 6/5/24    4. What information was given to obtain insurance decision? Clinical office notes    5. Prior Auth Status? Pending    6. Patient Notified: N\A

## 2024-06-06 DIAGNOSIS — E66.01 CLASS 3 SEVERE OBESITY WITHOUT SERIOUS COMORBIDITY WITH BODY MASS INDEX (BMI) OF 40.0 TO 44.9 IN ADULT, UNSPECIFIED OBESITY TYPE (HCC): ICD-10-CM

## 2024-06-06 DIAGNOSIS — E66.9 OBESITY (BMI 30-39.9): ICD-10-CM

## 2024-06-06 DIAGNOSIS — R63.8 WEIGHT DISORDER: ICD-10-CM

## 2024-06-06 RX ORDER — TIRZEPATIDE 2.5 MG/.5ML
2.5 INJECTION, SOLUTION SUBCUTANEOUS
Qty: 6 ML | Refills: 0 | Status: SHIPPED | OUTPATIENT
Start: 2024-06-06 | End: 2024-06-06

## 2024-06-12 ENCOUNTER — PATIENT MESSAGE (OUTPATIENT)
Dept: MEDICAL GROUP | Facility: MEDICAL CENTER | Age: 39
End: 2024-06-12
Payer: COMMERCIAL

## 2024-06-12 DIAGNOSIS — E66.9 OBESITY (BMI 30-39.9): ICD-10-CM

## 2024-06-25 DIAGNOSIS — E66.9 OBESITY (BMI 30-39.9): ICD-10-CM

## 2024-07-30 ENCOUNTER — APPOINTMENT (OUTPATIENT)
Dept: MEDICAL GROUP | Facility: MEDICAL CENTER | Age: 39
End: 2024-07-30
Payer: COMMERCIAL

## 2024-09-10 NOTE — TELEPHONE ENCOUNTER
Orders:    CBC and differential; Future    Comprehensive metabolic panel; Future     Received request via: Patient    Was the patient seen in the last year in this department? Yes    Does the patient have an active prescription (recently filled or refills available) for medication(s) requested? No     ----- Message from Ling Angel sent at 4/20/2020  1:18 PM PDT -----  Regarding: Prescription Question  Contact: [phone]  Lennox Bhatti!    Hope you're doing well! I was hoping you could send a prescription in for Ketoconazole shampoo for me please? Also I am out of my Metformin prescription and was hoping to renew this through you as I'm looking for a new GYN practice. Thank you and stay safe!

## 2024-11-11 ENCOUNTER — PATIENT MESSAGE (OUTPATIENT)
Dept: MEDICAL GROUP | Facility: MEDICAL CENTER | Age: 39
End: 2024-11-11
Payer: COMMERCIAL

## 2024-11-11 DIAGNOSIS — E28.2 PCOS (POLYCYSTIC OVARIAN SYNDROME): ICD-10-CM

## 2025-04-16 ENCOUNTER — TELEPHONE (OUTPATIENT)
Dept: MEDICAL GROUP | Facility: MEDICAL CENTER | Age: 40
End: 2025-04-16
Payer: COMMERCIAL

## 2025-04-16 NOTE — TELEPHONE ENCOUNTER
She will need to be seen as it has been more than 1 year.  Also I will need accurate diagnosis to attach the referral.  Please recommend patient to schedule appointment.  She can go to [facility] or [facility] urgent care as they will not need referral

## 2025-04-16 NOTE — TELEPHONE ENCOUNTER
VOICEMAIL  1. Caller Name: Ling Angel  Call Back Number: [phone] (home)       2. Message: Pt called to request a referral to [facility] as she was headed there due to a possible broken foot and toe.